# Patient Record
Sex: FEMALE | Race: WHITE | Employment: OTHER | URBAN - METROPOLITAN AREA
[De-identification: names, ages, dates, MRNs, and addresses within clinical notes are randomized per-mention and may not be internally consistent; named-entity substitution may affect disease eponyms.]

---

## 2018-09-11 ENCOUNTER — HOSPITAL ENCOUNTER (OUTPATIENT)
Dept: LAB | Age: 43
Discharge: HOME | End: 2018-09-11
Payer: OTHER GOVERNMENT

## 2018-09-11 PROCEDURE — 88305 TISSUE EXAM BY PATHOLOGIST: CPT | Performed by: PLASTIC SURGERY

## 2018-12-25 ENCOUNTER — HOSPITAL ENCOUNTER (EMERGENCY)
Age: 43
Discharge: LEFT BEFORE DISCHARGE | End: 2018-12-25
Attending: EMERGENCY MEDICINE
Payer: OTHER GOVERNMENT

## 2018-12-25 DIAGNOSIS — Z53.21 PATIENT LEFT WITHOUT BEING SEEN: Primary | ICD-10-CM

## 2018-12-25 PROCEDURE — 75810000275 HC EMERGENCY DEPT VISIT NO LEVEL OF CARE

## 2018-12-26 NOTE — ED PROVIDER NOTES
Social History     Socioeconomic History    Marital status: Not on file     Spouse name: Not on file    Number of children: Not on file    Years of education: Not on file    Highest education level: Not on file   Social Needs    Financial resource strain: Not on file    Food insecurity - worry: Not on file    Food insecurity - inability: Not on file    Transportation needs - medical: Not on file   [employer] needs - non-medical: Not on file   Occupational History    Not on file   Tobacco Use    Smoking status: Not on file   Substance and Sexual Activity    Alcohol use: Not on file    Drug use: Not on file    Sexual activity: Not on file   Other Topics Concern    Not on file   Social History Narrative    Not on file       Patient left 30 minutes after check in prior to triage.     Fausto Mcarthur MD

## 2018-12-27 ENCOUNTER — HOSPITAL ENCOUNTER (EMERGENCY)
Age: 43
Discharge: HOME | End: 2018-12-27
Attending: EMERGENCY MEDICINE
Payer: OTHER GOVERNMENT

## 2018-12-27 VITALS
OXYGEN SATURATION: 100 % | WEIGHT: 168 LBS | SYSTOLIC BLOOD PRESSURE: 146 MMHG | RESPIRATION RATE: 20 BRPM | HEART RATE: 111 BPM | BODY MASS INDEX: 29.77 KG/M2 | HEIGHT: 63 IN | TEMPERATURE: 98.3 F | DIASTOLIC BLOOD PRESSURE: 85 MMHG

## 2018-12-27 PROCEDURE — 75810000275 HC EMERGENCY DEPT VISIT NO LEVEL OF CARE

## 2018-12-27 RX ORDER — ZINC GLUCONATE 10 MG
LOZENGE ORAL
COMMUNITY
End: 2020-08-02

## 2018-12-27 RX ORDER — FLUOXETINE 10 MG/1
CAPSULE ORAL DAILY
COMMUNITY
End: 2020-08-02

## 2018-12-27 RX ORDER — PHENTERMINE HYDROCHLORIDE 37.5 MG/1
37.5 CAPSULE ORAL
COMMUNITY
End: 2019-04-04

## 2018-12-27 NOTE — ED TRIAGE NOTES
Patient with complaints of right flank pain that started 2 days ago. Patient with a history of kidney stones.

## 2018-12-27 NOTE — ED NOTES
Attempted to locate patient, not in wr bathroom, treatment area bathroom;  Called in waiting room several times, not outside the waiting area;   Called pt's number listed and voicemail left for patient

## 2019-04-02 ENCOUNTER — APPOINTMENT (OUTPATIENT)
Dept: CT IMAGING | Age: 44
DRG: 896 | End: 2019-04-02
Attending: EMERGENCY MEDICINE
Payer: OTHER GOVERNMENT

## 2019-04-02 ENCOUNTER — APPOINTMENT (OUTPATIENT)
Dept: GENERAL RADIOLOGY | Age: 44
DRG: 896 | End: 2019-04-02
Attending: EMERGENCY MEDICINE
Payer: OTHER GOVERNMENT

## 2019-04-02 ENCOUNTER — HOSPITAL ENCOUNTER (INPATIENT)
Age: 44
LOS: 2 days | Discharge: HOME | DRG: 896 | End: 2019-04-04
Attending: EMERGENCY MEDICINE | Admitting: HOSPITALIST
Payer: OTHER GOVERNMENT

## 2019-04-02 DIAGNOSIS — R56.9 NEW ONSET SEIZURE (HCC): Primary | ICD-10-CM

## 2019-04-02 PROBLEM — Z79.899 POLYPHARMACY: Status: ACTIVE | Noted: 2019-04-02

## 2019-04-02 PROBLEM — F19.939 DRUG WITHDRAWAL (HCC): Status: ACTIVE | Noted: 2019-04-02

## 2019-04-02 PROBLEM — G40.901 STATUS EPILEPTICUS (HCC): Status: ACTIVE | Noted: 2019-04-02

## 2019-04-02 LAB
ALBUMIN SERPL-MCNC: 4 G/DL (ref 3.4–5)
ALBUMIN/GLOB SERPL: 1.3 {RATIO} (ref 0.8–1.7)
ALP SERPL-CCNC: 99 U/L (ref 45–117)
ALT SERPL-CCNC: 18 U/L (ref 13–56)
AMMONIA PLAS-SCNC: <10 UMOL/L (ref 11–32)
AMPHET UR QL SCN: NEGATIVE
ANION GAP SERPL CALC-SCNC: 10 MMOL/L (ref 3–18)
APAP SERPL-MCNC: <2 UG/ML (ref 10–30)
APPEARANCE UR: CLEAR
AST SERPL-CCNC: 18 U/L (ref 15–37)
BARBITURATES UR QL SCN: NEGATIVE
BASOPHILS # BLD: 0 K/UL (ref 0–0.1)
BASOPHILS NFR BLD: 0 % (ref 0–2)
BENZODIAZ UR QL: POSITIVE
BILIRUB SERPL-MCNC: 0.2 MG/DL (ref 0.2–1)
BILIRUB UR QL: NEGATIVE
BUN SERPL-MCNC: 11 MG/DL (ref 7–18)
BUN/CREAT SERPL: 11 (ref 12–20)
CALCIUM SERPL-MCNC: 8.9 MG/DL (ref 8.5–10.1)
CANNABINOIDS UR QL SCN: NEGATIVE
CHLORIDE SERPL-SCNC: 114 MMOL/L (ref 100–108)
CK MB CFR SERPL CALC: NORMAL % (ref 0–4)
CK MB SERPL-MCNC: <1 NG/ML (ref 5–25)
CK SERPL-CCNC: 57 U/L (ref 26–192)
CO2 SERPL-SCNC: 20 MMOL/L (ref 21–32)
COCAINE UR QL SCN: NEGATIVE
COLOR UR: YELLOW
CREAT SERPL-MCNC: 1.01 MG/DL (ref 0.6–1.3)
DIFFERENTIAL METHOD BLD: ABNORMAL
EOSINOPHIL # BLD: 0.1 K/UL (ref 0–0.4)
EOSINOPHIL NFR BLD: 0 % (ref 0–5)
ERYTHROCYTE [DISTWIDTH] IN BLOOD BY AUTOMATED COUNT: 13.7 % (ref 11.6–14.5)
EST. AVERAGE GLUCOSE BLD GHB EST-MCNC: NORMAL MG/DL
ETHANOL SERPL-MCNC: <3 MG/DL (ref 0–3)
FOLATE SERPL-MCNC: >20 NG/ML (ref 3.1–17.5)
GLOBULIN SER CALC-MCNC: 3.1 G/DL (ref 2–4)
GLUCOSE BLD STRIP.AUTO-MCNC: 100 MG/DL (ref 70–110)
GLUCOSE BLD STRIP.AUTO-MCNC: 100 MG/DL (ref 70–110)
GLUCOSE BLD STRIP.AUTO-MCNC: 119 MG/DL (ref 70–110)
GLUCOSE BLD STRIP.AUTO-MCNC: 120 MG/DL (ref 70–110)
GLUCOSE SERPL-MCNC: 127 MG/DL (ref 74–99)
GLUCOSE UR STRIP.AUTO-MCNC: NEGATIVE MG/DL
HBA1C MFR BLD: 4.8 % (ref 4.2–5.6)
HCG UR QL: NEGATIVE
HCT VFR BLD AUTO: 42.5 % (ref 35–45)
HDSCOM,HDSCOM: ABNORMAL
HGB BLD-MCNC: 14.1 G/DL (ref 12–16)
HGB UR QL STRIP: NEGATIVE
KETONES UR QL STRIP.AUTO: NEGATIVE MG/DL
LACTATE BLD-SCNC: 2.29 MMOL/L (ref 0.4–2)
LACTATE SERPL-SCNC: 2 MMOL/L (ref 0.4–2)
LEUKOCYTE ESTERASE UR QL STRIP.AUTO: NEGATIVE
LIPASE SERPL-CCNC: 164 U/L (ref 73–393)
LITHIUM SERPL-SCNC: 0.32 MMOL/L (ref 0.6–1.2)
LYMPHOCYTES # BLD: 2.1 K/UL (ref 0.9–3.6)
LYMPHOCYTES NFR BLD: 13 % (ref 21–52)
MAGNESIUM SERPL-MCNC: 2.4 MG/DL (ref 1.6–2.6)
MCH RBC QN AUTO: 30.8 PG (ref 24–34)
MCHC RBC AUTO-ENTMCNC: 33.2 G/DL (ref 31–37)
MCV RBC AUTO: 92.8 FL (ref 74–97)
METHADONE UR QL: NEGATIVE
MONOCYTES # BLD: 0.7 K/UL (ref 0.05–1.2)
MONOCYTES NFR BLD: 5 % (ref 3–10)
NEUTS SEG # BLD: 12.9 K/UL (ref 1.8–8)
NEUTS SEG NFR BLD: 82 % (ref 40–73)
NITRITE UR QL STRIP.AUTO: NEGATIVE
OPIATES UR QL: NEGATIVE
PCP UR QL: NEGATIVE
PH UR STRIP: 6.5 [PH] (ref 5–8)
PLATELET # BLD AUTO: 424 K/UL (ref 135–420)
PMV BLD AUTO: 9.2 FL (ref 9.2–11.8)
POTASSIUM SERPL-SCNC: 4.2 MMOL/L (ref 3.5–5.5)
PROT SERPL-MCNC: 7.1 G/DL (ref 6.4–8.2)
PROT UR STRIP-MCNC: NEGATIVE MG/DL
RBC # BLD AUTO: 4.58 M/UL (ref 4.2–5.3)
SALICYLATES SERPL-MCNC: 5.1 MG/DL (ref 2.8–20)
SODIUM SERPL-SCNC: 144 MMOL/L (ref 136–145)
SP GR UR REFRACTOMETRY: 1.01 (ref 1–1.03)
TROPONIN I SERPL-MCNC: <0.02 NG/ML (ref 0–0.04)
TSH SERPL DL<=0.05 MIU/L-ACNC: 1.9 UIU/ML (ref 0.36–3.74)
UROBILINOGEN UR QL STRIP.AUTO: 0.2 EU/DL (ref 0.2–1)
VIT B12 SERPL-MCNC: 1970 PG/ML (ref 211–911)
WBC # BLD AUTO: 15.8 K/UL (ref 4.6–13.2)

## 2019-04-02 PROCEDURE — 74011250636 HC RX REV CODE- 250/636

## 2019-04-02 PROCEDURE — 82140 ASSAY OF AMMONIA: CPT

## 2019-04-02 PROCEDURE — 96374 THER/PROPH/DIAG INJ IV PUSH: CPT

## 2019-04-02 PROCEDURE — 74011000258 HC RX REV CODE- 258: Performed by: HOSPITALIST

## 2019-04-02 PROCEDURE — 36415 COLL VENOUS BLD VENIPUNCTURE: CPT

## 2019-04-02 PROCEDURE — 93005 ELECTROCARDIOGRAM TRACING: CPT

## 2019-04-02 PROCEDURE — 83690 ASSAY OF LIPASE: CPT

## 2019-04-02 PROCEDURE — 84443 ASSAY THYROID STIM HORMONE: CPT

## 2019-04-02 PROCEDURE — 80178 ASSAY OF LITHIUM: CPT

## 2019-04-02 PROCEDURE — 74011250636 HC RX REV CODE- 250/636: Performed by: HOSPITALIST

## 2019-04-02 PROCEDURE — 83735 ASSAY OF MAGNESIUM: CPT

## 2019-04-02 PROCEDURE — 95819 EEG AWAKE AND ASLEEP: CPT | Performed by: PSYCHIATRY & NEUROLOGY

## 2019-04-02 PROCEDURE — 82962 GLUCOSE BLOOD TEST: CPT

## 2019-04-02 PROCEDURE — 82607 VITAMIN B-12: CPT

## 2019-04-02 PROCEDURE — 85025 COMPLETE CBC W/AUTO DIFF WBC: CPT

## 2019-04-02 PROCEDURE — 80307 DRUG TEST PRSMV CHEM ANLYZR: CPT

## 2019-04-02 PROCEDURE — 83605 ASSAY OF LACTIC ACID: CPT

## 2019-04-02 PROCEDURE — 65610000006 HC RM INTENSIVE CARE

## 2019-04-02 PROCEDURE — 77010033678 HC OXYGEN DAILY

## 2019-04-02 PROCEDURE — 71045 X-RAY EXAM CHEST 1 VIEW: CPT

## 2019-04-02 PROCEDURE — 99285 EMERGENCY DEPT VISIT HI MDM: CPT

## 2019-04-02 PROCEDURE — 70450 CT HEAD/BRAIN W/O DYE: CPT

## 2019-04-02 PROCEDURE — 74011250636 HC RX REV CODE- 250/636: Performed by: EMERGENCY MEDICINE

## 2019-04-02 PROCEDURE — 83036 HEMOGLOBIN GLYCOSYLATED A1C: CPT

## 2019-04-02 PROCEDURE — 96376 TX/PRO/DX INJ SAME DRUG ADON: CPT

## 2019-04-02 PROCEDURE — 80053 COMPREHEN METABOLIC PANEL: CPT

## 2019-04-02 PROCEDURE — 81003 URINALYSIS AUTO W/O SCOPE: CPT

## 2019-04-02 PROCEDURE — 81025 URINE PREGNANCY TEST: CPT

## 2019-04-02 PROCEDURE — 74011000250 HC RX REV CODE- 250: Performed by: HOSPITALIST

## 2019-04-02 PROCEDURE — 82550 ASSAY OF CK (CPK): CPT

## 2019-04-02 RX ORDER — LEVETIRACETAM 100 MG/ML
1000 SOLUTION ORAL
Status: DISCONTINUED | OUTPATIENT
Start: 2019-04-02 | End: 2019-04-02

## 2019-04-02 RX ORDER — ENOXAPARIN SODIUM 100 MG/ML
40 INJECTION SUBCUTANEOUS EVERY 24 HOURS
Status: DISCONTINUED | OUTPATIENT
Start: 2019-04-02 | End: 2019-04-04 | Stop reason: HOSPADM

## 2019-04-02 RX ORDER — LORAZEPAM 2 MG/ML
1 INJECTION INTRAMUSCULAR
Status: DISCONTINUED | OUTPATIENT
Start: 2019-04-02 | End: 2019-04-04

## 2019-04-02 RX ORDER — LEVETIRACETAM 10 MG/ML
1000 INJECTION INTRAVASCULAR
Status: COMPLETED | OUTPATIENT
Start: 2019-04-02 | End: 2019-04-02

## 2019-04-02 RX ORDER — ONDANSETRON 2 MG/ML
4 INJECTION INTRAMUSCULAR; INTRAVENOUS
Status: DISCONTINUED | OUTPATIENT
Start: 2019-04-02 | End: 2019-04-04

## 2019-04-02 RX ORDER — LORAZEPAM 2 MG/ML
INJECTION INTRAMUSCULAR
Status: COMPLETED
Start: 2019-04-02 | End: 2019-04-02

## 2019-04-02 RX ORDER — MAGNESIUM SULFATE 100 %
4 CRYSTALS MISCELLANEOUS AS NEEDED
Status: DISCONTINUED | OUTPATIENT
Start: 2019-04-02 | End: 2019-04-04 | Stop reason: HOSPADM

## 2019-04-02 RX ORDER — ACETAMINOPHEN 325 MG/1
650 TABLET ORAL
Status: DISCONTINUED | OUTPATIENT
Start: 2019-04-02 | End: 2019-04-04 | Stop reason: HOSPADM

## 2019-04-02 RX ORDER — LEVETIRACETAM 100 MG/ML
1000 SOLUTION ORAL
Status: DISCONTINUED | OUTPATIENT
Start: 2019-04-02 | End: 2019-04-02 | Stop reason: SDUPTHER

## 2019-04-02 RX ORDER — SODIUM CHLORIDE 9 MG/ML
125 INJECTION, SOLUTION INTRAVENOUS CONTINUOUS
Status: DISCONTINUED | OUTPATIENT
Start: 2019-04-02 | End: 2019-04-03

## 2019-04-02 RX ORDER — DEXTROSE 50 % IN WATER (D50W) INTRAVENOUS SYRINGE
25-50 AS NEEDED
Status: DISCONTINUED | OUTPATIENT
Start: 2019-04-02 | End: 2019-04-04 | Stop reason: HOSPADM

## 2019-04-02 RX ORDER — LITHIUM CARBONATE 300 MG
300 TABLET ORAL 3 TIMES DAILY
COMMUNITY
End: 2020-08-02

## 2019-04-02 RX ORDER — LORAZEPAM 2 MG/ML
INJECTION INTRAMUSCULAR
Status: DISPENSED
Start: 2019-04-02 | End: 2019-04-02

## 2019-04-02 RX ORDER — DIPHENOXYLATE HYDROCHLORIDE AND ATROPINE SULFATE 2.5; .025 MG/1; MG/1
TABLET ORAL
COMMUNITY
End: 2020-08-02

## 2019-04-02 RX ORDER — CODEINE SULFATE 30 MG/1
TABLET ORAL
COMMUNITY
End: 2019-04-04

## 2019-04-02 RX ORDER — LEVETIRACETAM 10 MG/ML
1000 INJECTION INTRAVASCULAR EVERY 12 HOURS
Status: DISCONTINUED | OUTPATIENT
Start: 2019-04-02 | End: 2019-04-03

## 2019-04-02 RX ORDER — DIAZEPAM 5 MG/1
5 TABLET ORAL
COMMUNITY
End: 2019-04-04

## 2019-04-02 RX ORDER — LORAZEPAM 2 MG/ML
2 INJECTION INTRAMUSCULAR
Status: CANCELLED | OUTPATIENT
Start: 2019-04-02 | End: 2019-04-02

## 2019-04-02 RX ORDER — INSULIN LISPRO 100 [IU]/ML
INJECTION, SOLUTION INTRAVENOUS; SUBCUTANEOUS EVERY 6 HOURS
Status: DISCONTINUED | OUTPATIENT
Start: 2019-04-02 | End: 2019-04-03

## 2019-04-02 RX ORDER — INSULIN LISPRO 100 [IU]/ML
INJECTION, SOLUTION INTRAVENOUS; SUBCUTANEOUS
Status: DISCONTINUED | OUTPATIENT
Start: 2019-04-02 | End: 2019-04-02

## 2019-04-02 RX ORDER — LORAZEPAM 2 MG/ML
2 INJECTION INTRAMUSCULAR ONCE
Status: COMPLETED | OUTPATIENT
Start: 2019-04-02 | End: 2019-04-02

## 2019-04-02 RX ORDER — QUETIAPINE FUMARATE 100 MG/1
50 TABLET, FILM COATED ORAL 2 TIMES DAILY
COMMUNITY
End: 2019-04-04

## 2019-04-02 RX ORDER — HYDROCODONE BITARTRATE AND ACETAMINOPHEN 10; 325 MG/1; MG/1
1 TABLET ORAL
COMMUNITY
End: 2019-04-04

## 2019-04-02 RX ADMIN — FOLIC ACID: 5 INJECTION, SOLUTION INTRAMUSCULAR; INTRAVENOUS; SUBCUTANEOUS at 05:07

## 2019-04-02 RX ADMIN — LORAZEPAM 2 MG: 2 INJECTION INTRAMUSCULAR at 00:29

## 2019-04-02 RX ADMIN — ENOXAPARIN SODIUM 40 MG: 40 INJECTION SUBCUTANEOUS at 06:06

## 2019-04-02 RX ADMIN — LORAZEPAM 2 MG: 2 INJECTION INTRAMUSCULAR; INTRAVENOUS at 00:29

## 2019-04-02 RX ADMIN — SODIUM CHLORIDE 125 ML/HR: 900 INJECTION, SOLUTION INTRAVENOUS at 05:07

## 2019-04-02 RX ADMIN — LEVETIRACETAM 1000 MG: 10 INJECTION INTRAVENOUS at 14:10

## 2019-04-02 RX ADMIN — FAMOTIDINE 20 MG: 10 INJECTION, SOLUTION INTRAVENOUS at 23:10

## 2019-04-02 RX ADMIN — PIPERACILLIN AND TAZOBACTAM 3.38 G: 3; .375 INJECTION, POWDER, FOR SOLUTION INTRAVENOUS at 07:52

## 2019-04-02 RX ADMIN — FAMOTIDINE 20 MG: 10 INJECTION, SOLUTION INTRAVENOUS at 09:50

## 2019-04-02 RX ADMIN — LORAZEPAM 2 MG: 2 INJECTION INTRAMUSCULAR at 02:25

## 2019-04-02 RX ADMIN — SODIUM CHLORIDE 125 ML/HR: 900 INJECTION, SOLUTION INTRAVENOUS at 15:59

## 2019-04-02 RX ADMIN — LEVETIRACETAM 1000 MG: 10 INJECTION INTRAVENOUS at 03:54

## 2019-04-02 RX ADMIN — PIPERACILLIN AND TAZOBACTAM 3.38 G: 3; .375 INJECTION, POWDER, FOR SOLUTION INTRAVENOUS at 15:56

## 2019-04-02 RX ADMIN — PIPERACILLIN AND TAZOBACTAM 3.38 G: 3; .375 INJECTION, POWDER, FOR SOLUTION INTRAVENOUS at 23:10

## 2019-04-02 RX ADMIN — LORAZEPAM 1 MG: 2 INJECTION INTRAMUSCULAR; INTRAVENOUS at 23:46

## 2019-04-02 NOTE — ED NOTES
Received call from ed tech in CT w/pt. Per CT tech, pt having seizure, twitching, pulling gown off. Received verbal order from Dr Bard Yue ANGULO for 2mg ativan IV. Upon arrival in CT pt agitated and twitching, pulling clothes off, incontinent, not oriented, ativan given 2mg IV at 0225.  Pt back from CT, cleaned, pt gurgling, suctioned, attempted NPA placement but pt became awake and began flailing limbs, discontinued attempt, pt placed on 1L NC.

## 2019-04-02 NOTE — PROGRESS NOTES
520 [facility] ED called to give report on patient before coming to ICU 
0345 Patient arrived in ICU, connected to ICU monitors, linen and gown changed, CHG bath given, patient is unresponsive postictal 
0400 Assessment complete, patient is bradycardic  
0615 Dr Bill Zarco came to see patient, aware that patient has been bradycardic, patient has still not woken up from postictal stage, will assess when patient is alert. Seizure precautions remain, padded bed rails in place

## 2019-04-02 NOTE — PROGRESS NOTES
attempted visit with patient but she was asleep and unavailable.  left devotional and brochure. Chaplains remain available to provide pastoral support to patient and family as needed and requested. . Lalo Goel [phone]

## 2019-04-02 NOTE — ED PROVIDER NOTES
EMERGENCY DEPARTMENT HISTORY AND PHYSICAL EXAM 
 
Date: 4/2/2019 Patient Name: Edna Sosa History of Presenting Illness No chief complaint on file. HPI:  
12:25 AM 
Edna Sosa is a 37 y.o. female with H/O possible seizure who presents to the emergency department C/O questionable seizure. Spouse at bedside initially saw pt standing and somewhat confused. Then pt sent to her room and when he followed her, she started having tremors, became unresponsive and describes seizure like activity. She was incontinent of both urine and stool. Spouse thinks that she may have taken too much medication, he thinks Flexeril. Spouse was not aware of any drug use or EtOH abuse but he is aware she takes too many medications. EMS reports postictal state at scene. Pt is somnolent and appears postictal at ED arrival and unable to obtain any hx from her Spouse also brought pt's medications and she has 3 empty bottles of Valium, Norco, and Tylenol 3. All 3 bottles were filled in March. PCP: Other, MD Martir 
 
Current Outpatient Medications Medication Sig Dispense Refill  magnesium 250 mg tab Take  by mouth.  FLUoxetine (PROZAC) 10 mg capsule Take  by mouth daily.  phentermine (ADIPEX-P) 37.5 mg capsule Take 37.5 mg by mouth every morning. Past History Past Medical History: 
Past Medical History:  
Diagnosis Date  Kidney stones Past Surgical History: No past surgical history on file. Family History: No family history on file. Social History: 
Social History Tobacco Use  Smoking status: Passive Smoke Exposure - Never Smoker  Smokeless tobacco: Never Used Substance Use Topics  Alcohol use: No  
  Frequency: Never  Drug use: Not on file Allergies: 
No Known Allergies Review of Systems Review of Systems Unable to perform ROS: Mental status change Physical Exam  
There were no vitals filed for this visit.  
Physical Exam  
 Constitutional: She is oriented to person, place, and time. She appears well-developed and well-nourished. No distress. Pt appears postictal. She mumbles. She appears confuse and she has some twitching with rhythmic movements of both lower extremities and twitching of the right hand. HENT:  
Head: Normocephalic and atraumatic. Right Ear: External ear normal. No swelling or tenderness. Tympanic membrane is not perforated, not erythematous and not bulging. Left Ear: External ear normal. No swelling or tenderness. Tympanic membrane is not perforated, not erythematous and not bulging. Nose: Nose normal. No mucosal edema or rhinorrhea. Right sinus exhibits no maxillary sinus tenderness and no frontal sinus tenderness. Left sinus exhibits no maxillary sinus tenderness and no frontal sinus tenderness. Mouth/Throat: Uvula is midline, oropharynx is clear and moist and mucous membranes are normal. No oral lesions. No trismus in the jaw. No dental abscesses or uvula swelling. No oropharyngeal exudate, posterior oropharyngeal edema, posterior oropharyngeal erythema or tonsillar abscesses. Scalp shows no sign of trauma. Dentition is intact. Tongue is intact, no bleeding. Eyes: Conjunctivae are normal. Right eye exhibits no discharge. Left eye exhibits no discharge. No scleral icterus. Neck: Normal range of motion. Neck supple. Cardiovascular: Normal rate, regular rhythm, normal heart sounds and intact distal pulses. Exam reveals no gallop and no friction rub. No murmur heard. Pulmonary/Chest: Effort normal and breath sounds normal. No accessory muscle usage. No tachypnea. No respiratory distress. She has no decreased breath sounds. She has no wheezes. She has no rhonchi. She has no rales. Abdominal: Soft. Musculoskeletal: Normal range of motion. She exhibits no edema or tenderness. Lymphadenopathy:  
  She has no cervical adenopathy. Neurological: She is alert and oriented to person, place, and time. GCS eye subscore is 2. GCS verbal subscore is 4. GCS motor subscore is 5. As above, pt is postictal; possibly having seizure. She is unable to follow commands but will open eyes to pain. Moves extremities to pain. Skin: Skin is warm and dry. No rash noted. She is not diaphoretic. No erythema. Psychiatric: She has a normal mood and affect. Judgment normal.  
Nursing note and vitals reviewed. Diagnostic Study Results Labs - No results found for this or any previous visit (from the past 12 hour(s)). Radiologic Studies -  
CT HEAD WO CONT Final Result IMPRESSION:  
  
No acute intracranial abnormality. Partially imaged right maxillary sinus opacity of uncertain acuity and current  
significance. XR CHEST PORT Final Result Impression:  
-------------- No active pulmonary disease. CT Results  (Last 48 hours) None CXR Results  (Last 48 hours) None Medications given in the ED- Medications - No data to display Medical Decision Making I am the first provider for this patient. I reviewed the vital signs, available nursing notes, past medical history, past surgical history, family history and social history. Vital Signs-Reviewed the patient's vital signs. Pulse Oximetry Analysis - 98% on O2 via NC Cardiac Monitor: 
Rate: 82 bpm 
Rhythm: NSR 
 
EKG interpretation: (Preliminary) 12:41 AM  
NSR at 68 bpm. Normal QRS Records Reviewed: Nursing Notes and Old Medical Records Provider Notes (Medical Decision Making):  
INITIAL CLINICAL IMPRESSION and PLANS: 
The patient presents with the primary complaint(s) of: seizure. The presentation, to include historical aspects and clinical findings are consistent with the DX of seizure. However, other possible DX's to consider as primary, associated with, or exacerbated by include: 1. Drug withdrawal 
2. Status epilepticus 3.  Encephalopathy 4. Intracranial bleed 5. CVA 6. Drug overdose 7. MI 8. ACS 9. Psych Procedures: 
Procedures ED Course:  
12:25 AM Initial assessment performed. The patients presenting problems have been discussed, and they are in agreement with the care plan formulated and outlined with them. I have encouraged them to ask questions as they arise throughout their visit. CONSULT NOTE:  
2:27 AM 
I spoke with Meryle Pearson, MD  
Specialty: Neurology Discussed pt's hx, disposition, and available diagnostic and imaging results  over the telephone. Reviewed care plans. Consulting physician agrees with plans as outlined. Recommended 1 g Keppra and admit to the hospitalist.   
 
CONSULT NOTE:  
3:08 AM 
I spoke with Sabrina Brooks MD  
Specialty: Hospitalist 
Discussed pt's hx, disposition, and available diagnostic and imaging results  over the telephone. Reviewed care plans. Consulting physician agrees with plans as outlined. Agrees to admit. Discussion: 
Spouse described seizure prior to pt being brought to ED by EMS. Appears pt had another seizure witnessed by MD and treated with Ativan. Spouse also brought empty bottles of narcotics and Valium. The bottles are empty and if pt was compliant, she would have at least a week left of Norco and Tylenol 3. Spouse thought she was taking Flexeril but she had an old bottle of Baclofen. If she finished these 3 bottles of her meds early, she could be having withdrawal seizures. Her drug screen was positive only for benzo's indicating that she may have been taking more than prescribed doses of her narcotics. Consulted neurology who recommended Keppra and admission to hospitalist. 
 
 
Diagnosis and Disposition Critical Care Time:  
3:02 PM 
I have spent 60 minutes of critical care time involved in lab review, consultations with specialist, family decision-making, and documentation.   During this entire length of time I was immediately available to the patient. Critical Care: The reason for providing this level of medical care for this critically ill patient was due a critical illness that impaired one or more vital organ systems such that there was a high probability of imminent or life threatening deterioration in the patients condition. This care involved high complexity decision making to assess, manipulate, and support vital system functions, to treat this degree vital organ system failure and to prevent further life threatening deterioration of the patients condition. Core Measures: 
For Hospitalized Patients: 
 
1. Hospitalization Decision Time: The decision to hospitalize the patient was made by Albino Oconnor MD at 12:25 AM on 4/2/2019 2. Aspirin: Aspirin was not given because the patient did not present with a stroke at the time of their Emergency Department evaluation 3:08 AM  Patient is being admitted to the hospital by Srikanth Serrato MD. The results of their tests and reasons for their admission have been discussed with them and/or available family. They convey agreement and understanding for the need to be admitted and for their admission diagnosis. CONDITIONS ON ADMISSION: 
Sepsis is not present at the time of admission. Deep Vein Thrombosis is not present at the time of admission. Thrombosis is not present at the time of admission. Urinary Tract Infection is not present at the time of admission. Pneumonia is not present at the time of admission. MRSA is not present at the time of admission. Wound infection is not present at the time of admission. Pressure Ulcer is not present at the time of admission. CLINICAL IMPRESSION: 
 
1. New onset seizure ([address] PLAN: 
1.  Admit to ICU

## 2019-04-02 NOTE — H&P
[facility] [place] HISTORY AND PHYSICAL Name:  Coreen Kothari 
MR#:   [id number] :  1975 ACCOUNT #:  [de-identified] ADMIT DATE:  2019 ADMISSION DIAGNOSES: 
1.  Status epilepticus. 2.  Altered mental status. 3.  Polypharmacy. HISTORY OF PRESENT ILLNESS:  This is a 80-year-old  female who is brought into the hospital because of altered mental status and seizure at home. Her  had come home from work. He found her to be confused, complaining of headache and dizziness. She apparently collapsed and had a seizure. She was brought into the emergency room where she was brought by EMS. She was only alert to pain upon arrival.  She had incontinence of bowel and bladder and her  was concerned that she may have taken too much Flexeril, but has no history of seizures in the past.  She is unable to give any history at this point in time. She has had Ativan for recurrent seizure when she went to CT scan. She arouses to a sternal rub, but she is not verbal at this point and she is certainly not able to give any history. Her  took her child home, so he is not present currently for my exam.  She was examined in the emergency room. PAST MEDICAL HISTORY:  Per report includes kidney stones, depression. PAST SURGICAL HISTORY:  Unknown. FAMILY HISTORY:  Unknown. SOCIAL HISTORY:  Not a smoker. No regular alcohol use. No polysubstance or illicit substance use per the 's report. ALLERGIES:  SHE HAS NO MEDICATION ALLERGY LISTED. MEDICATIONS:  She has on the list came in with Codeine with Tylenol, Valium, Lomotil, Prozac, Norco, lithium, magnesium, Adipex, and Seroquel. Apparently, she has a [address] [place] [address] Dr, we do not know that name. REVIEW OF SYSTEMS:  Is not able to be obtained due to the patient's altered mental status. PHYSICAL EXAMINATION: 
GENERAL:  This is a 80-year-old  female who is somnolent. VITAL SIGNS:  Her temperature is 96.7, pulse 51, blood pressure 154/76, respiratory rate is 13, SaO2 is 100% on 1 liter nasal cannula. She is 121 pounds. HEENT:  Her oropharynx is dry. No lesions. Sclerae anicteric. Conjunctivae pink. Pupils are constricted, not reactive currently. She just received Ativan 2 mg. NECK:  Supple. Appears to be without adenopathy or mass. LUNGS:  Clear bilaterally. No rales, rhonchi, or wheezes. CARDIAC:  Regular rate and rhythm. No murmur, rub, or gallop. ABDOMEN:  Soft, nontender. No distention. Normal bowel sounds. No ascites or mass. LOWER EXTREMITIES:  No clubbing, cyanosis, or edema. Distal pulses are palpable. NEUROLOGIC:  Unable to be performed due to her somnolent status. LABORATORY DATA:  Lactate was 2.29. Her white count 15.8, H and H 14.1 and 42.5, platelets are 427. Urinalysis was within normal limits. Her chemistry also unremarkable. Glucose 127. Lactate normalized to 2. On repeat, her urine pregnancy was negative. Her liver enzymes and cardiac enzymes are normal.  Urine drug screen was positive for benzos. Alcohol level less than 3. Lithium level has been sent. Acetaminophen level less than 2. Salicylate level within normal limits at 5.1. CT scan without contrast of the head shows no acute intracranial abnormality. Chest x-ray shows no active pulmonary disease and EKG shows normal sinus rhythm. Neurology consult was obtained from the ER. Recommendations were to load with Keppra and to consider polypharmacy contributing to seizure state. ASSESSMENT: 
1.  Status epilepticus. 2.  New onset seizure disorder. 3.  Polypharmacy and multiple medications. 4.  Unknown medical history, preempting multiple medications on her list for what appeared to be pain issues, possible psychiatric issues also. Certainly, we will need to obtain more information when her  comes back in.   I would like to know if she had been hospitalized for any psychiatric issue or overdose in the past.  Her  was not concerned about suicidal ideation per the nursing report or overdose. Also, he did feel like she had taken too many of her medications in an attempt to feel better or treatment underlying condition that he was maybe not aware of, so we need further clarification on what medication she is supposed to be on, whether there was attempts to overdose on these. At this point, there is no toxic levels noted. CT scan showed no acute stroke. We are going to continue Keppra 1 g every 12 hours, fluid resuscitation. I am going to give her a banana bag once a day at least for the next 24-48 hours. I am going to check an ammonia level, hemoglobin A1c, TSH, vitamin B12, and folate. She will be n.p.o. for now. We will allow her to wake up. She will have a neurology consult this morning and once she is awake and able to communicate, we will discuss whether she needs to have consultation with psychiatry here. Aspiration precautions in the interim, ICU admission expect at least 3 days in the hospital. 
 
 
Luis Sorensen MD 
 
 
[id number] 
D:  04/02/2019 4:48 T:  04/02/2019 4:51 JOB #:  [id number]

## 2019-04-02 NOTE — DIABETES MGMT
GLYCEMIC CONTROL PROGRESS NOTE: 
 
GLYCEMIC CONTROL & NUTRITION: 
 
 
- chart reviewed, no known h/o DM 
- glycemic control needs identified at this time Recent Glucose Results:  
Lab Results Component Value Date/Time  (H) 04/02/2019 12:40 AM  
 GLUCPOC 100 04/02/2019 06:07 AM  
 
Timmy Posada MS, RN, CDE Glycemic Control Team 
[phone] Pager [phone] (M-TH 8:00-4:30P) *After Hours pager [phone]

## 2019-04-02 NOTE — PROGRESS NOTES
Bedside and Verbal shift change report given to Giuseppe Price RN (oncoming nurse) by Estela Wolfe RN (offgoing nurse). Report included the following information SBAR, ED Summary, Intake/Output, MAR, Recent Results, Cardiac Rhythm sinus roderick and Quality Measures.

## 2019-04-02 NOTE — ROUTINE PROCESS
TRANSFER - OUT REPORT: 
 
Verbal report given to Tobi RN(name) on Rhonda Kaur  being transferred to ICU(unit) for routine progression of care Report consisted of patients Situation, Background, Assessment and  
Recommendations(SBAR). Information from the following report(s) SBAR, ED Summary, [facility] and Cardiac Rhythm Sinus Gloria Cobos was reviewed with the receiving nurse. Lines:  
Saline Lock 04/02/19 Left Antecubital (Active) Site Assessment Clean, dry, & intact 4/2/2019  1:06 AM  
Phlebitis Assessment 0 4/2/2019  1:06 AM  
Infiltration Assessment 0 4/2/2019  1:06 AM  
Dressing Status Clean, dry, & intact 4/2/2019  1:06 AM  
Dressing Type Transparent 4/2/2019  1:06 AM  
Hub Color/Line Status Pink;Flushed;Patent 4/2/2019  1:06 AM  
Action Taken Blood drawn 4/2/2019  1:06 AM  
   
Saline Lock 04/02/19 Right Hand (Active) Site Assessment Clean, dry, & intact 4/2/2019  1:06 AM  
Phlebitis Assessment 0 4/2/2019  1:06 AM  
Infiltration Assessment 0 4/2/2019  1:06 AM  
Dressing Status Clean, dry, & intact 4/2/2019  1:06 AM  
Dressing Type Transparent 4/2/2019  1:06 AM  
Hub Color/Line Status Pink;Flushed;Patent 4/2/2019  1:06 AM  
Action Taken Blood drawn 4/2/2019  1:06 AM  
  
 
Opportunity for questions and clarification was provided. Patient transported with: 
 Monitor O2 @ 1 liters Registered Nurse

## 2019-04-02 NOTE — ED NOTES
Primary Nurse Cash Magallanes and Zenia ROTHMAN RN, RN performed a dual skin assessment on this patient No impairment noted

## 2019-04-02 NOTE — PROCEDURES
ELECTROENCEPHALOGRAPHY     Patient: Loy Posadas MRN: [id number]  CSN: [id number]    YOB: 1975  Age: 37 y.o. Sex: female    DOA: 4/2/2019 LOS:  LOS: 0 days        Date of Service: 04/02/2019    DICTATING: Constance Neal MD     REFERRING PHYSICIAN: Dr. Ludy Talbert: 19-23    HISTORY OF PRESENT ILLNESS: This is a 27-year-old female who presented to ER with unresponsiveness and seizures. Currently, she is unresponsive. This EEG was performed in order to assess electrodiagnostic risk factors for epilepsy. ELECTROENCEPHALOGRAM INTERPRETATION: This is a referential and bipolar EEG recorded with a 10-20 system. EEG background Shows remarkable myogenic artifact in the frontal leads, which impairs the EEG recording. There is generalized 2-3 hertz delta activity intermixed with 4 to 5 hertz theta activities. Even though the patient is seem to be sleeping, there is no regular sleep pattern. Photic stimulation does not elicit any abnormal activity. There are intermittent interictal discharges on the right paracentral Regions during the study. IMPRESSION: This EEG is abnormal due to moderate generalized slowing, which is an indicator of diffuse cerebral dysfunction from any cause including -but not limited to- toxic, metabolic and infectious etiologies. There are also intermittent interictal discharges on the right paracentral region, which indicate increased risk factor for seizures and possible localizations for them. Clinical correlation is recommended.          Signed:  Constance Neal MD  4/2/2019  4:43 PM

## 2019-04-02 NOTE — CONSULTS
[facility]                  Pulmonary, Critical Care, and Sleep Medicine     Name: Arley Joseph MRN: [id number]   : 1975 Hospital: [facility] [place]    [facility] 2019        [facility]                                              Consult requesting physician: Dr. Michael Christie  Reason for Consult: seizure    Subjective/History of Present Illness:     Patient is a 37 y.o. female with PMHx significant for chronic LBP, spinal cord stimulator removal 2017, admitted with seizure likely due to polypharmacy vs benzo withdrawal.     2019  Admitted with unresponsiveness and seizure. Seizure at home and in ER and in CT department. Generalized tonic-clonic. A/w urine and bowel incontinence. Postictal. Received ativan in ER and in CT. CT head nothing acute. She was on several medications including lithium, diazepam, hydrocodone, codeine, quetiapine, fluoxetine and phentermine.  reported taking too many flexeril, but it is not on her medication list.   UTOx positive for benzo. Still AMS and so hx is taken from EMR. The patient is critically ill and can not provide additional history due to Unable to speak and Unable to comprehend. History taken from EMR    Review of Systems:  Review of systems not obtained due to patient factors. No Known Allergies   Past Medical History:   Diagnosis Date    Kidney stones       No past surgical history on file. Social History     Tobacco Use    Smoking status: Passive Smoke Exposure - Never Smoker    Smokeless tobacco: Never Used   Substance Use Topics    Alcohol use: No     Frequency: Never      No family history on file. Prior to Admission medications    Medication Sig Start Date End Date Taking? Authorizing Provider   lithium carbonate 300 mg tablet Take 300 mg by mouth three (3) times daily.    Yes Other, MD Martir   diphenoxylate-atropine (LOMOTIL) 2.5-0.025 mg per tablet Take  by mouth four (4) times daily as needed for Diarrhea. Yes Sandra, MD Martir   diazePAM (VALIUM) 5 mg tablet Take 5 mg by mouth every six (6) hours as needed for Anxiety. Yes Sandra, MD Martir   HYDROcodone-acetaminophen (NORCO)  mg tablet Take 1 Tab by mouth. Yes Sandra, MD Martir   codeine sulfate 30 mg tablet Take  by mouth every four (4) hours as needed. Yes Sandra, MD Martir   QUEtiapine (SEROQUEL) 100 mg tablet Take 50 mg by mouth two (2) times a day. Yes Sandra, MD Martir   magnesium 250 mg tab Take  by mouth. Sandra, MD Martir   FLUoxetine (PROZAC) 10 mg capsule Take  by mouth daily. Martir Flores MD   phentermine (ADIPEX-P) 37.5 mg capsule Take 37.5 mg by mouth every morning. Sandra, MD Martir     Current Facility-Administered Medications   Medication Dose Route Frequency    levETIRAcetam in saline (iso-os) (KEPPRA) infusion 1,000 mg  1,000 mg IntraVENous Q12H    0.9% sodium chloride infusion  125 mL/hr IntraVENous CONTINUOUS    enoxaparin (LOVENOX) injection 40 mg  40 mg SubCUTAneous Q24H    insulin lispro (HUMALOG) injection   SubCUTAneous Q6H    piperacillin-tazobactam (ZOSYN) 3.375 g in 0.9% sodium chloride (MBP/ADV) 100 mL MBP  3.375 g IntraVENous Q8H    famotidine (PF) (PEPCID) 20 mg in sodium chloride 0.9% 10 mL injection  20 mg IntraVENous Q12H         Objective:   Vital Signs:    Visit Vitals  BP (!) 153/92   Pulse (!) 54   Temp 97.6 °F (36.4 °C)   Resp 13   Wt 55.2 kg (121 lb 11.1 oz)   SpO2 100%   BMI 21.56 kg/m²       O2 Device: Nasal cannula   O2 Flow Rate (L/min): 2 l/min   Temp (24hrs), Av.8 °F (36 °C), Min:96.3 °F (35.7 °C), Max:97.6 °F (36.4 °C)       Intake/Output:   Last shift:      No intake/output data recorded.     Last 3 shifts:  1901 -  0700  In: 100 [I.V.:100]  Out: -       Intake/Output Summary (Last 24 hours) at 2019 0927  Last data filed at 2019 0354  Gross per 24 hour   Intake 100 ml   Output    Net 100 ml       Last 3 Recorded Weights in this Encounter    19 0031   Weight: 55.2 kg (121 lb 11.1 oz)       Physical Exam:     General/Neurology: Awake, confused. Not following any commands. Moving all 4 limbs. DTR 2 + b/l ankle and bicep. Planters flexor. Head:   Normocephalic, without obvious abnormality, atraumatic. Eye:   EOM intact, no scleral icterus, no pallor, no cyanosis. Pupils dilated 4 mm but reactive. Nose:   No sinus tenderness. Throat:  Lips, mucosa, and tongue normal. No oral thrush. Neck:   Supple, symmetric. No lymphadenopathy. Trachea midline  Lung: Moderate air entry bilateral equal. No rales. No rhonchi. No wheezing. No stridors. No prolongded expiration. No accessory muscle use. Heart:   Regular rate & rhythm. S1 S2 present. No murmur. No JVD. Abdomen:  Soft. NT. ND. +BS. No masses. Extremities:  No pedal edema. No cyanosis. No clubbing. Pulses: 2+ and symmetric in DP. Capillary refill: normal  Lymphatic:  No cervical or supraclavicular palpable lymphadenopathy. Musculoskeletal: No joint swelling. No tenderness. Skin:   Color, texture, turgor normal. No rashes or lesions.        Data:       Recent Results (from the past 24 hour(s))   EKG, 12 LEAD, INITIAL    Collection Time: 04/02/19 12:36 AM   Result Value Ref Range    Ventricular Rate 68 BPM    Atrial Rate 68 BPM    P-R Interval 146 ms    QRS Duration 92 ms    Q-T Interval 412 ms    QTC Calculation (Bezet) 438 ms    Calculated P Axis 28 degrees    Calculated R Axis 21 degrees    Calculated T Axis 37 degrees    Diagnosis       Normal sinus rhythm  Normal ECG  No previous ECGs available     POC LACTIC ACID    Collection Time: 04/02/19 12:39 AM   Result Value Ref Range    Lactic Acid (POC) 2.29 (HH) 0.40 - 2.00 mmol/L   CBC WITH AUTOMATED DIFF    Collection Time: 04/02/19 12:40 AM   Result Value Ref Range    WBC 15.8 (H) 4.6 - 13.2 K/uL    RBC 4.58 4.20 - 5.30 M/uL    HGB 14.1 12.0 - 16.0 g/dL    HCT 42.5 35.0 - 45.0 %    MCV 92.8 74.0 - 97.0 FL    MCH 30.8 24.0 - 34.0 PG    MCHC 33.2 31.0 - 37.0 g/dL    RDW 13.7 11.6 - 14.5 %    PLATELET 599 (H) 424 - 420 K/uL    MPV 9.2 9.2 - 11.8 FL    NEUTROPHILS 82 (H) 40 - 73 %    LYMPHOCYTES 13 (L) 21 - 52 %    MONOCYTES 5 3 - 10 %    EOSINOPHILS 0 0 - 5 %    BASOPHILS 0 0 - 2 %    ABS. NEUTROPHILS 12.9 (H) 1.8 - 8.0 K/UL    ABS. LYMPHOCYTES 2.1 0.9 - 3.6 K/UL    ABS. MONOCYTES 0.7 0.05 - 1.2 K/UL    ABS. EOSINOPHILS 0.1 0.0 - 0.4 K/UL    ABS. BASOPHILS 0.0 0.0 - 0.1 K/UL    DF AUTOMATED     METABOLIC PANEL, COMPREHENSIVE    Collection Time: 04/02/19 12:40 AM   Result Value Ref Range    Sodium 144 136 - 145 mmol/L    Potassium 4.2 3.5 - 5.5 mmol/L    Chloride 114 (H) 100 - 108 mmol/L    CO2 20 (L) 21 - 32 mmol/L    Anion gap 10 3.0 - 18 mmol/L    Glucose 127 (H) 74 - 99 mg/dL    BUN 11 7.0 - 18 MG/DL    Creatinine 1.01 0.6 - 1.3 MG/DL    BUN/Creatinine ratio 11 (L) 12 - 20      GFR est AA >60 >60 ml/min/1.73m2    GFR est non-AA 60 (L) >60 ml/min/1.73m2    Calcium 8.9 8.5 - 10.1 MG/DL    Bilirubin, total 0.2 0.2 - 1.0 MG/DL    ALT (SGPT) 18 13 - 56 U/L    AST (SGOT) 18 15 - 37 U/L    Alk.  phosphatase 99 45 - 117 U/L    Protein, total 7.1 6.4 - 8.2 g/dL    Albumin 4.0 3.4 - 5.0 g/dL    Globulin 3.1 2.0 - 4.0 g/dL    A-G Ratio 1.3 0.8 - 1.7     LIPASE    Collection Time: 04/02/19 12:40 AM   Result Value Ref Range    Lipase 164 73 - 393 U/L   MAGNESIUM    Collection Time: 04/02/19 12:40 AM   Result Value Ref Range    Magnesium 2.4 1.6 - 2.6 mg/dL   SALICYLATE    Collection Time: 04/02/19 12:40 AM   Result Value Ref Range    Salicylate level 5.1 2.8 - 20.0 MG/DL   ACETAMINOPHEN    Collection Time: 04/02/19 12:40 AM   Result Value Ref Range    Acetaminophen level <2 (L) 10.0 - 30.0 ug/mL   CARDIAC PANEL,(CK, CKMB & TROPONIN)    Collection Time: 04/02/19 12:40 AM   Result Value Ref Range    CK 57 26 - 192 U/L    CK - MB <1.0 <3.6 ng/ml    CK-MB Index  0.0 - 4.0 %     CALCULATION NOT PERFORMED WHEN RESULT IS BELOW LINEAR LIMIT    Troponin-I, QT <0.02 0.0 - 0.045 NG/ML   ETHYL ALCOHOL    Collection Time: 04/02/19 12:40 AM   Result Value Ref Range    ALCOHOL(ETHYL),SERUM <3 0 - 3 MG/DL   HEMOGLOBIN A1C WITH EAG    Collection Time: 04/02/19 12:40 AM   Result Value Ref Range    Hemoglobin A1c 4.8 4.2 - 5.6 %    Est. average glucose Cannot be calculated mg/dL   URINALYSIS W/ RFLX MICROSCOPIC    Collection Time: 04/02/19  1:00 AM   Result Value Ref Range    Color YELLOW      Appearance CLEAR      Specific gravity 1.009 1.005 - 1.030      pH (UA) 6.5 5.0 - 8.0      Protein NEGATIVE  NEG mg/dL    Glucose NEGATIVE  NEG mg/dL    Ketone NEGATIVE  NEG mg/dL    Bilirubin NEGATIVE  NEG      Blood NEGATIVE  NEG      Urobilinogen 0.2 0.2 - 1.0 EU/dL    Nitrites NEGATIVE  NEG      Leukocyte Esterase NEGATIVE  NEG     DRUG SCREEN, URINE    Collection Time: 04/02/19  1:00 AM   Result Value Ref Range    BENZODIAZEPINES POSITIVE (A) NEG      BARBITURATES NEGATIVE  NEG      THC (TH-CANNABINOL) NEGATIVE  NEG      OPIATES NEGATIVE  NEG      PCP(PHENCYCLIDINE) NEGATIVE  NEG      COCAINE NEGATIVE  NEG      AMPHETAMINES NEGATIVE  NEG      METHADONE NEGATIVE  NEG      HDSCOM (NOTE)    HCG URINE, QL    Collection Time: 04/02/19  1:00 AM   Result Value Ref Range    HCG urine, QL NEGATIVE  NEG     LACTIC ACID    Collection Time: 04/02/19  3:00 AM   Result Value Ref Range    Lactic acid 2.0 0.4 - 2.0 MMOL/L   GLUCOSE, POC    Collection Time: 04/02/19  6:07 AM   Result Value Ref Range    Glucose (POC) 100 70 - 110 mg/dL   AMMONIA    Collection Time: 04/02/19  6:09 AM   Result Value Ref Range    Ammonia <10 (L) 11 - 32 UMOL/L         Chemistry Recent Labs     04/02/19  0040   *      K 4.2   *   CO2 20*   BUN 11   CREA 1.01   CA 8.9   MG 2.4   AGAP 10   BUCR 11*   AP 99   TP 7.1   ALB 4.0   GLOB 3.1   AGRAT 1.3        Lactic Acid Lactic acid   Date Value Ref Range Status   04/02/2019 2.0 0.4 - 2.0 MMOL/L Final     Recent Labs     04/02/19  0300   LAC 2.0        Liver Enzymes Protein, total Date Value Ref Range Status   04/02/2019 7.1 6.4 - 8.2 g/dL Final     Albumin   Date Value Ref Range Status   04/02/2019 4.0 3.4 - 5.0 g/dL Final     Globulin   Date Value Ref Range Status   04/02/2019 3.1 2.0 - 4.0 g/dL Final     A-G Ratio   Date Value Ref Range Status   04/02/2019 1.3 0.8 - 1.7   Final     AST (SGOT)   Date Value Ref Range Status   04/02/2019 18 15 - 37 U/L Final     Alk. phosphatase   Date Value Ref Range Status   04/02/2019 99 45 - 117 U/L Final     Recent Labs     04/02/19  0040   TP 7.1   ALB 4.0   GLOB 3.1   AGRAT 1.3   SGOT 18   AP 99        CBC w/Diff Recent Labs     04/02/19  0040   WBC 15.8*   RBC 4.58   HGB 14.1   HCT 42.5   *   GRANS 82*   LYMPH 13*   EOS 0        Cardiac Enzymes Lab Results   Component Value Date/Time    CPK 57 04/02/2019 12:40 AM    CKMB <1.0 04/02/2019 12:40 AM    CKND1  04/02/2019 12:40 AM     CALCULATION NOT PERFORMED WHEN RESULT IS BELOW LINEAR LIMIT    TROIQ <0.02 04/02/2019 12:40 AM        BNP No results found for: BNP, BNPP, XBNPT     Coagulation No results for input(s): PTP, INR, APTT in the last 72 hours. No lab exists for component: INREXT      Thyroid  No results found for: T4, T3U, TSH, TSHEXT    No results found for: T4     Urinalysis Lab Results   Component Value Date/Time    Color YELLOW 04/02/2019 01:00 AM    Appearance CLEAR 04/02/2019 01:00 AM    Specific gravity 1.009 04/02/2019 01:00 AM    pH (UA) 6.5 04/02/2019 01:00 AM    Protein NEGATIVE  04/02/2019 01:00 AM    Glucose NEGATIVE  04/02/2019 01:00 AM    Ketone NEGATIVE  04/02/2019 01:00 AM    Bilirubin NEGATIVE  04/02/2019 01:00 AM    Urobilinogen 0.2 04/02/2019 01:00 AM    Nitrites NEGATIVE  04/02/2019 01:00 AM    Leukocyte Esterase NEGATIVE  04/02/2019 01:00 AM        Micro  No results for input(s): SDES, CULT in the last 72 hours. No results for input(s): CULT in the last 72 hours.      ABG No results for input(s): PHI, PHI, POC2, PCO2I, PO2, PO2I, HCO3, HCO3I, FIO2, FIO2I in the last 72 hours. CT (Most Recent) (CT chest reviewed by me) Results from Hospital Encounter encounter on 04/02/19   CT HEAD WO CONT    Narrative EXAM: CT head    INDICATION: Seizures. COMPARISON: None. TECHNIQUE: Axial CT imaging of the head was performed without intravenous  contrast. Dose reduction techniques used: automated exposure control, adjustment  of the mAs and/or kVp according to patient size, and iterative reconstruction  techniques. _______________    FINDINGS:    BRAIN AND POSTERIOR FOSSA: The sulci, folia, ventricles and basal cisterns are  within normal limits for the patient's age. There is no intracranial hemorrhage,  mass effect, or midline shift. There are no areas of abnormal parenchymal  attenuation. EXTRA-AXIAL SPACES AND MENINGES: There are no abnormal extra-axial fluid  collections. CALVARIUM: Intact. SINUSES: There is a partially imaged right maxillary sinus opacity. OTHER: None.    _______________      Impression IMPRESSION:    No acute intracranial abnormality. Partially imaged right maxillary sinus opacity of uncertain acuity and current  significance. XR (Most Recent). CXR  reviewed by me and compared with previous CXR Results from [place] encounter on 04/02/19   XR CHEST PORT    Narrative ---------------------------------------------------------------------------  <<<<<<<<<           [facility]           >>>>>>>>>   ---------------------------------------------------------------------------    CLINICAL HISTORY:  Seizure. COMPARISON EXAMINATIONS:  None. ---  SINGLE FRONTAL VIEW OF THE CHEST  ---    The lungs and pleural spaces are clear. The mediastinum is unremarkable in  appearance. No significant osseous abnormalities are identified.        --------------    Impression Impression:  --------------    No active pulmonary disease.             IMPRESSION:   · Seizure, ?polypharmacy vs benzo withdrawal. · AMS, likely postictal   · Leucocytosis, likely due to stress  · Sinus bradycardia, improving  · Unknown PMHx but she was on several medications including lithium, diazepam, hydrocodone, codeine, quetiapine, fluoxetine and phentermine. · Code status: full       RECOMMENDATIONS:   Respiratory: no acute issues. Protecting airway. Aspiration precautions due to AMS. Keep SPO2 >=92%. HOB 30 degree elevation all the time. Aggressive pulmonary toileting. Aspiration precautions. Incentive spirometry. CVS: mild sinus roderick in 40's, now in 50's. Monitor. BP stable. ID: leucocytosis due to stress. No fever. UA negative. CXR negative. abd soft. No sign of infection. On empiric zosyn. If fever, blood cx. May d/c zosyn in 24-48 hrs if no sign of infection. Hematology/Oncology: no acute issues  Renal: no acute issues. Monitor renal function. GI/: pregnancy test negative. No acute issues  Endocrine: Monitor BS. SSI. Neurology: seen by Dr. Pratik Catherine. CT head nothing acute. Watch for benzo withdrawal. On keppra. Ativan prn. EEG now. Serial neuro check. Pain/Sedation: no acute issues. Skin/Wound: no acute issues  Electrolytes: Replace electrolytes per ICU electrolyte replacement protocol. IVF: NS at 125  Nutrition: npo while AMS  Prophylaxis: DVT Prophylaxis: lovenox. GI Prophylaxis: pepcid. Restraints: Wrist soft restraints for patient interfering with medical therapy/management and patient safety. Lines/Tubes: PIV    Will defer respective systems problem management to primary and other respective consultant and follow patient in ICU with primary and other medical team.  Further recommendations will be based on the patient's response to recommended treatment and results of the investigation ordered. Quality Care: PPI, DVT prophylaxis, HOB elevated, Infection control all reviewed and addressed. Care of plan d/w RN, RT, MDR. No family at bedside.      High complexity decision making was performed during the evaluation of this patient at high risk for decompensation with multiple organ involvement. Total critical care time spent rendering care exclusive of procedures: 33 minutes.          Felicita Moe MD  4/2/2019

## 2019-04-02 NOTE — CONSULTS
NEUROLOGY CONSULTATION NOTE    Patient: Deirdre Lowry MRN: [id number]  CSN: [id number]    YOB: 1975  Age: 37 y.o. Sex: female    DOA: 4/2/2019 LOS:  LOS: 0 days        Requesting Physician: Dr. Mounika Daley  Reason for Consultation: Seizure    HISTORY OF PRESENT ILLNESS:   Deirdre Lowry is a 54-year-old female who presented to ER with unresponsiveness and seizures. Apparently, the patient had a seizure at home and had another seizure while in the ER. The seizures were generalized tonic-clonic in nature and the last seizure was accompanied by urinary/bowel incontinence. The patient is confused and postictal, therefore an accurate history couldnt be obtained. Per the outside hospital charts, the patient had chronic low back pain and had a spinal cord stimulator placed in 2016, which was removed in December 2017 due to malfunction. She was on several medications including lithium, diazepam, hydrocodone, codeine, quetiapine, fluoxetine and phentermine. I received a call at around 2:30 AM from the ER and apparently, the patient ran out of some of these medications recently. Due to the recurrent seizures, she was given 2 mg of Ativan and was loaded with levetiracetam. Her head CT didnt show any acute events. PAST MEDICAL HISTORY:  Past Medical History:   Diagnosis Date    Kidney stones      PAST SURGICAL HISTORY:  No past surgical history on file. FAMILY HISTORY:  No family history on file.   SOCIAL HISTORY:  Social History     Socioeconomic History    Marital status:      Spouse name: Not on file    Number of children: Not on file    Years of education: Not on file    Highest education level: Not on file   Tobacco Use    Smoking status: Passive Smoke Exposure - Never Smoker    Smokeless tobacco: Never Used   Substance and Sexual Activity    Alcohol use: No     Frequency: Never     MEDICATIONS:  Current Facility-Administered Medications   Medication Dose Route Frequency    levETIRAcetam in saline (iso-os) (KEPPRA) infusion 1,000 mg  1,000 mg IntraVENous Q12H    0.9% sodium chloride infusion  125 mL/hr IntraVENous CONTINUOUS    enoxaparin (LOVENOX) injection 40 mg  40 mg SubCUTAneous Q24H    glucose chewable tablet 16 g  4 Tab Oral PRN    glucagon (GLUCAGEN) injection 1 mg  1 mg IntraMUSCular PRN    dextrose (D50W) injection syrg 12.5-25 g  25-50 mL IntraVENous PRN    acetaminophen (TYLENOL) tablet 650 mg  650 mg Oral Q6H PRN    ondansetron (ZOFRAN) injection 4 mg  4 mg IntraVENous Q6H PRN    LORazepam (ATIVAN) injection 1 mg  1 mg IntraVENous Q2H PRN    insulin lispro (HUMALOG) injection   SubCUTAneous Q6H    piperacillin-tazobactam (ZOSYN) 3.375 g in 0.9% sodium chloride (MBP/ADV) 100 mL MBP  3.375 g IntraVENous Q8H    famotidine (PF) (PEPCID) 20 mg in sodium chloride 0.9% 10 mL injection  20 mg IntraVENous Q12H     Prior to Admission medications    Medication Sig Start Date End Date Taking? Authorizing Provider   lithium carbonate 300 mg tablet Take 300 mg by mouth three (3) times daily. Yes Sandra, MD Martir   diphenoxylate-atropine (LOMOTIL) 2.5-0.025 mg per tablet Take  by mouth four (4) times daily as needed for Diarrhea. Yes Sandra, MD Martir   diazePAM (VALIUM) 5 mg tablet Take 5 mg by mouth every six (6) hours as needed for Anxiety. Yes Sandra, MD Martir   HYDROcodone-acetaminophen (NORCO)  mg tablet Take 1 Tab by mouth. Yes Sandra, MD Martir   codeine sulfate 30 mg tablet Take  by mouth every four (4) hours as needed. Yes Sandra, MD Martir   QUEtiapine (SEROQUEL) 100 mg tablet Take 50 mg by mouth two (2) times a day. Yes Martir Flores MD   magnesium 250 mg tab Take  by mouth. Martir Flores MD   FLUoxetine (PROZAC) 10 mg capsule Take  by mouth daily. Martir Flores MD   phentermine (ADIPEX-P) 37.5 mg capsule Take 37.5 mg by mouth every morning.     Sanrda, MD Martir       ALLERGIES:  No Known Allergies    Review of Systems  Unable to obtain the review of systems since the patient is unresponsive and confused. PHYSICAL EXAMINATION:     Visit Vitals  BP (!) 161/95   Pulse (!) 51   Temp 96.7 °F (35.9 °C)   Resp 11   Wt 55.2 kg (121 lb 11.1 oz)   SpO2 100%   BMI 21.56 kg/m²    O2 Flow Rate (L/min): 2 l/min O2 Device: Nasal cannula  GENERAL: Somnolent, confused, restless. HEENT: Moist mucous membranes, sclerae anicteric, scalp is atraumatic. CVS: Regular rate and rhythm, no murmurs or gallops. No carotid bruits. PULMONARY: Coarse sounds anteriorly. No wheezing. EXTREMITIES: Normal range of motion at all sites. No deformities. ABDOMEN: Soft, nontender. SKIN: No rashes or ecchymoses. Warm and dry. NEUROLOGIC: The patient is somnolent but arousable loud voice and noxious stimuli. She opens her eyes but does not follow the commands. Pupils are equal and reactive to light. It is difficult to assess the visual fields or facial sensation. Tongue looks like midline. She grew grimaces her face symmetrically to noxious stimuli. Shes moving both upper and lower extremities restlessly, weaker on upper extremities bilaterally. She doesnt give me a handgrip. Deep tendon reflexes are diminished at all sides. Toes show withdrawal response. Labs: Results:       Chemistry Recent Labs     04/02/19 0040   *      K 4.2   *   CO2 20*   BUN 11   CREA 1.01   CA 8.9   AGAP 10   BUCR 11*   AP 99   TP 7.1   ALB 4.0   GLOB 3.1   AGRAT 1.3      CBC w/Diff Recent Labs     04/02/19 0040   WBC 15.8*   RBC 4.58   HGB 14.1   HCT 42.5   *   GRANS 82*   LYMPH 13*   EOS 0      Cardiac Enzymes Recent Labs     04/02/19 0040   CPK 57   CKND1 CALCULATION NOT PERFORMED WHEN RESULT IS BELOW LINEAR LIMIT      Coagulation No results for input(s): PTP, INR, APTT in the last 72 hours.     No lab exists for component: INREXT    Lipid Panel No results found for: CHOL, CHOLPOCT, CHOLX, CHLST, CHOLV, 305844, HDL, LDL, LDLC, DLDLP, 841724, VLDLC, VLDL, TGLX, TRIGL, TRIGP, TGLPOCT, CHHD, CHHDX   BNP No results for input(s): BNPP in the last 72 hours. Liver Enzymes Recent Labs     04/02/19  0040   TP 7.1   ALB 4.0   AP 99   SGOT 18      Thyroid Studies No results found for: T4, T3U, TSH, TSHEXT       Radiology:  Ct Head Wo Cont    Result Date: 4/2/2019  EXAM: CT head INDICATION: Seizures. COMPARISON: None. TECHNIQUE: Axial CT imaging of the head was performed without intravenous contrast. Dose reduction techniques used: automated exposure control, adjustment of the mAs and/or kVp according to patient size, and iterative reconstruction techniques. _______________ FINDINGS: BRAIN AND POSTERIOR FOSSA: The sulci, folia, ventricles and basal cisterns are within normal limits for the patient's age. There is no intracranial hemorrhage, mass effect, or midline shift. There are no areas of abnormal parenchymal attenuation. EXTRA-AXIAL SPACES AND MENINGES: There are no abnormal extra-axial fluid collections. CALVARIUM: Intact. SINUSES: There is a partially imaged right maxillary sinus opacity. OTHER: None. _______________     IMPRESSION: No acute intracranial abnormality. Partially imaged right maxillary sinus opacity of uncertain acuity and current significance. Xr Chest Port    Result Date: 4/2/2019  --------------------------------------------------------------------------- <<<<<<<<<           [facility]           >>>>>>>>> --------------------------------------------------------------------------- CLINICAL HISTORY:  Seizure. COMPARISON EXAMINATIONS:  None. ---  SINGLE FRONTAL VIEW OF THE CHEST  --- The lungs and pleural spaces are clear. The mediastinum is unremarkable in appearance. No significant osseous abnormalities are identified.  --------------    Impression: -------------- No active pulmonary disease.     ASSESSMENT/IMPRESSION:  The clinical presentation is most suggestive of provoked seizures In the setting of polypharmacy, possible medication side effects or withdrawal. This may be due to benzo withdrawal as well. It is reasonable to keep the patient on levetiracetam for now until she wakes up and be obtained for history. It is reasonable to obtain an EEG and corollary history as well. RECOMMENDATIONS:  1. EEG  2. Continue levetiracetam 1000 mg BID, IV or PO for now  3. Serial neuro checks  4. Supportive treatment with IV fluids and treatment of infection if any  5. Check serum lithium level and B12 levels. I will follow the patient.  Please do not hesitate to return with any questions.    ------------------------------------  Mark Guerra MD  4/2/2019  7:51 AM

## 2019-04-02 NOTE — ED TRIAGE NOTES
Pt arrived via EMS c/c seizure, pt alert to pain only upon arrival, pt spouse founf pt standing and twitching, pt has incontinence prior to arrival, pt spouse stated that she may have taken too much flexeril and has no hx of seizures.

## 2019-04-02 NOTE — PROGRESS NOTES
Reason for Admission:  C/o seizure activity RRAT Score:   low Plan for utilizing home health:      TBD Current Advanced Directive/Advance Care Plan:not on chart   Will inform physician during IDR's Likelihood of Readmission:  no                      
Transition of Care Plan:  Chart reviewed noted from ED notes pt arrived via EMS spouse reported finding pt standing and twitching with episode of incontinence, no prior hx of seizure activity, spouse believes pt may have taken too much flexeril,CT head normal,pt does take multiple medications which could have lead to seizure like movements, cm will cont to review case, attend IDR's and initiate d/c planning. Care Management Interventions Current Support Network: Lives with Spouse

## 2019-04-02 NOTE — PROGRESS NOTES
0730-Bedside and Verbal shift change report given to Missael Wayne RN (oncoming nurse) by Martha Obando RN (offgoing nurse). Report included the following information SBAR, Kardex, Intake/Output, MAR and Recent Results. 0740- Initial shift assessment complete. Dr Maryam Bradford at bedside. Patient starting to wake up, thrashing around bed, confused, not responding verbally, but moaning. Orders received for restraints to protect lines. Will continue to monitor. Seizure precautions in place. Per MD he will order an EEG. 1200-Reassessment complete, see EMR for changes. 1600- Reassessment complete, when stimulated patient started sitting up and pulling at restraints. Patient responds \"Ofelia\" when asked her name. Able to get patient up lay back down. Pt Unable to answer any other questions. Follows simple commands at times. Patient had several episodes of unmeasurable urine incontinence. Attempted to place external purwick, but patient moves legs too much for it to stay in place. 1930-Bedside and Verbal shift change report given to Martha Obando RN (oncoming nurse) by Missael Wayne RN (offgoing nurse). Report included the following information SBAR, Kardex, MAR and Cardiac Rhythm SR with PVC.

## 2019-04-02 NOTE — PROGRESS NOTES
Hospitalist Progress Note Patient: Sun Murray MRN: [id number]  CSN: [id number] YOB: 1975  Age: 37 y.o. Sex: female DOA: 4/2/2019 LOS:  LOS: 0 days Patient Active Problem List  
Diagnosis Code  Status epilepticus ([facility] G40.901  Polypharmacy Z79.899  Drug withdrawal ([facility] [id number]  New onset seizure ([facility] R56.9 Admitted 4/2 for status epilepticus Found seizing at home-per  Multiple meds in her room, for various ailments including lithium, muscle relaxers, codeine, seroquel No hx of seizures CT head unremarkable Given ativan, then keppra in ER Not able to participate in ROS or history as somnolent Will need psych eval after awakens H&P dictated Admit to ICU Neurology consult

## 2019-04-03 LAB
GLUCOSE BLD STRIP.AUTO-MCNC: 131 MG/DL (ref 70–110)
GLUCOSE BLD STRIP.AUTO-MCNC: 88 MG/DL (ref 70–110)
GLUCOSE BLD STRIP.AUTO-MCNC: 93 MG/DL (ref 70–110)

## 2019-04-03 PROCEDURE — 74011250637 HC RX REV CODE- 250/637: Performed by: HOSPITALIST

## 2019-04-03 PROCEDURE — 74011000250 HC RX REV CODE- 250: Performed by: HOSPITALIST

## 2019-04-03 PROCEDURE — 74011250636 HC RX REV CODE- 250/636: Performed by: HOSPITALIST

## 2019-04-03 PROCEDURE — 74011000258 HC RX REV CODE- 258: Performed by: HOSPITALIST

## 2019-04-03 PROCEDURE — 65660000000 HC RM CCU STEPDOWN

## 2019-04-03 PROCEDURE — 77010033678 HC OXYGEN DAILY

## 2019-04-03 PROCEDURE — 82962 GLUCOSE BLOOD TEST: CPT

## 2019-04-03 PROCEDURE — 93005 ELECTROCARDIOGRAM TRACING: CPT

## 2019-04-03 PROCEDURE — 74011250637 HC RX REV CODE- 250/637: Performed by: PSYCHIATRY & NEUROLOGY

## 2019-04-03 RX ORDER — DIVALPROEX SODIUM 500 MG/1
500 TABLET, EXTENDED RELEASE ORAL DAILY
Status: DISCONTINUED | OUTPATIENT
Start: 2019-04-03 | End: 2019-04-04 | Stop reason: HOSPADM

## 2019-04-03 RX ORDER — LORAZEPAM 2 MG/ML
INJECTION INTRAMUSCULAR
Status: DISPENSED
Start: 2019-04-03 | End: 2019-04-04

## 2019-04-03 RX ORDER — LORAZEPAM 1 MG/1
1 TABLET ORAL
Status: DISCONTINUED | OUTPATIENT
Start: 2019-04-03 | End: 2019-04-04

## 2019-04-03 RX ORDER — LORAZEPAM 2 MG/ML
2 INJECTION INTRAMUSCULAR
Status: DISCONTINUED | OUTPATIENT
Start: 2019-04-03 | End: 2019-04-04

## 2019-04-03 RX ORDER — LORAZEPAM 1 MG/1
2 TABLET ORAL
Status: DISCONTINUED | OUTPATIENT
Start: 2019-04-03 | End: 2019-04-04

## 2019-04-03 RX ORDER — LORAZEPAM 2 MG/ML
3 INJECTION INTRAMUSCULAR
Status: DISCONTINUED | OUTPATIENT
Start: 2019-04-03 | End: 2019-04-04

## 2019-04-03 RX ORDER — LORAZEPAM 2 MG/ML
1 INJECTION INTRAMUSCULAR
Status: DISCONTINUED | OUTPATIENT
Start: 2019-04-03 | End: 2019-04-04

## 2019-04-03 RX ADMIN — DIVALPROEX SODIUM 500 MG: 500 TABLET, EXTENDED RELEASE ORAL at 09:35

## 2019-04-03 RX ADMIN — SODIUM CHLORIDE 125 ML/HR: 900 INJECTION, SOLUTION INTRAVENOUS at 01:16

## 2019-04-03 RX ADMIN — FOLIC ACID: 5 INJECTION, SOLUTION INTRAMUSCULAR; INTRAVENOUS; SUBCUTANEOUS at 13:29

## 2019-04-03 RX ADMIN — FAMOTIDINE 20 MG: 10 INJECTION, SOLUTION INTRAVENOUS at 09:35

## 2019-04-03 RX ADMIN — LEVETIRACETAM 1000 MG: 10 INJECTION INTRAVENOUS at 01:16

## 2019-04-03 RX ADMIN — ACETAMINOPHEN 650 MG: 325 TABLET ORAL at 13:29

## 2019-04-03 RX ADMIN — LORAZEPAM 1 MG: 1 TABLET ORAL at 19:35

## 2019-04-03 RX ADMIN — ENOXAPARIN SODIUM 40 MG: 40 INJECTION SUBCUTANEOUS at 06:07

## 2019-04-03 RX ADMIN — PIPERACILLIN AND TAZOBACTAM 3.38 G: 3; .375 INJECTION, POWDER, FOR SOLUTION INTRAVENOUS at 06:07

## 2019-04-03 RX ADMIN — FAMOTIDINE 20 MG: 10 INJECTION, SOLUTION INTRAVENOUS at 20:42

## 2019-04-03 NOTE — PROGRESS NOTES
Hospitalist Progress Note Patient: Kenyetta Cannon MRN: [id number]  CSN: [id number] YOB: 1975  Age: 37 y.o. Sex: female DOA: 4/2/2019 LOS:  LOS: 1 day Chief Complaint: 
 
Status epilepticus Assessment/Plan New onset seizures with status epillepticu-now awake and interactive but weakened and slurred speech Seen by neuro-has abnl EEG so needs depakot as anticonvulsant as per neurology-will not be able to drive for now-has to have home meds reasessed-she is on lithium and xanax, and was on seroquel to help sleep-she ran out of [facility] and took 3 of these to help her sleep Polypharmacy-many meds, with some taken in advance to aide symptoms, but not in suicidal ideation Ok to transfer to tele Needs PT assessment She has PCP appt tomorrow-this physician prescribes her depression meds-will see if close appt can be kept but allow her to stay in hospital another 24 hrs due to severe illness and monitoring needed for 24 hrs more Labs reviewed Dicussed all with patient Can stop IVF and start oral diet Disposition : 
Patient Active Problem List  
Diagnosis Code  Status epilepticus ([facility] G40.901  Polypharmacy Z79.899  Drug withdrawal ([facility] [id number]  New onset seizure ([facility] R56.9 Subjective: 
 
Feels shaky Cannot use phone as cannot remember her passcode and feels her memory is poor Denies HA, nausea, CP Review of systems: 
 
Constitutional: denies fevers, chills Respiratory: denies SOB Cardiovascular: denies palpitations Gastrointestinal: denies  vomiting, diarrhea Vital signs/Intake and Output: 
Visit Vitals /75 (BP 1 Location: Right arm, BP Patient Position: At rest) Pulse 62 Temp 97.3 °F (36.3 °C) Resp 16 Wt 55.2 kg (121 lb 11.1 oz) SpO2 100% BMI 21.56 kg/m² Current Shift:  No intake/output data recorded. Last three shifts:  04/01 1901 - 04/03 0700 In: 2364.6 [I.V.:2364.6] Out: 1 Exam: General: Well developed, alert, NAD, OX3 Head/Neck: NCAT, supple, No masses, No lymphadenopathy CVS:Regular rate and rhythm, no M/R/G, S1/S2 heard, no thrill Lungs:Clear to auscultation bilaterally, no wheezes, rhonchi, or rales Abdomen: Soft, Nontender, No distention, Normal Bowel sounds, No hepatomegaly Extremities: No C/C/E, pulses palpable 2+ Neuro:grossly normal , follows commands, weak, tremulous Psych:appropriate Labs: Results:  
   
Chemistry Recent Labs 04/02/19 0040 *   
K 4.2 * CO2 20* BUN 11  
CREA 1.01  
CA 8.9 AGAP 10 BUCR 11* AP 99  
TP 7.1 ALB 4.0  
GLOB 3.1 AGRAT 1.3  
  
CBC w/Diff Recent Labs 04/02/19 0040 WBC 15.8*  
RBC 4.58 HGB 14.1 HCT 42.5 * GRANS 82* LYMPH 13* EOS 0 Cardiac Enzymes Recent Labs 04/02/19 0040 CPK 57 CKND1 CALCULATION NOT PERFORMED WHEN RESULT IS BELOW LINEAR LIMIT Coagulation No results for input(s): PTP, INR, APTT in the last 72 hours. No lab exists for component: INREXT Lipid Panel No results found for: CHOL, CHOLPOCT, CHOLX, CHLST, CHOLV, 223034, HDL, LDL, LDLC, DLDLP, 666061, VLDLC, VLDL, TGLX, TRIGL, TRIGP, TGLPOCT, CHHD, CHHDX  
BNP No results for input(s): BNPP in the last 72 hours. Liver Enzymes Recent Labs 04/02/19 0040 TP 7.1 ALB 4.0 AP 99 SGOT 18 Thyroid Studies Lab Results Component Value Date/Time TSH 1.90 04/02/2019 12:40 AM  
    
Procedures/imaging: see electronic medical records for all procedures/Xrays and details which were not copied into this note but were reviewed prior to creation of Plan Sunita Gloria MD

## 2019-04-03 NOTE — PROGRESS NOTES
Bedside shift change report given to Kandi Zarate RN and Mark Draper RN (oncoming nurse) by Alley Marie RN (offgoing nurse). Report included the following information SBAR, Kardex, ED Summary, Intake/Output, MAR, Accordion, Recent Results and Alarm Parameters .

## 2019-04-03 NOTE — PROGRESS NOTES
[facility] Pulmonary, Critical Care, and Sleep Medicine Name: Isaiah Colon MRN: [id number] : 1975 Hospital: [facility] [place] Date: 4/3/2019 PCCM Note Consult requesting physician: Dr. Hussain Borrero Reason for Consult: seizure Subjective/History of Present Illness:  
 
Patient is a 37 y.o. female with PMHx significant for chronic LBP, spinal cord stimulator removal 2017, admitted with seizure likely due to polypharmacy vs benzo withdrawal.  
 
4/3/2019  
eeg abnormal, keppra changed to depakote No fever chills cough cp Mental status normalized No other overnight issues No Known Allergies Past Medical History:  
Diagnosis Date  Kidney stones No past surgical history on file. Social History Tobacco Use  Smoking status: Passive Smoke Exposure - Never Smoker  Smokeless tobacco: Never Used Substance Use Topics  Alcohol use: No  
  Frequency: Never No family history on file. Prior to Admission medications Medication Sig Start Date End Date Taking? Authorizing Provider  
lithium carbonate 300 mg tablet Take 300 mg by mouth three (3) times daily. Yes Sandra, MD Martir  
diphenoxylate-atropine (LOMOTIL) 2.5-0.025 mg per tablet Take  by mouth four (4) times daily as needed for Diarrhea. Yes Martir Flores MD  
diazePAM (VALIUM) 5 mg tablet Take 5 mg by mouth every six (6) hours as needed for Anxiety. Yes Martir Flores MD  
HYDROcodone-acetaminophen (NORCO)  mg tablet Take 1 Tab by mouth. Yes Martir Flores MD  
codeine sulfate 30 mg tablet Take  by mouth every four (4) hours as needed. Yes Martir Flores MD  
QUEtiapine (SEROQUEL) 100 mg tablet Take 50 mg by mouth two (2) times a day. Yes Martir Flores MD  
magnesium 250 mg tab Take  by mouth. Martir Flores MD  
FLUoxetine (PROZAC) 10 mg capsule Take  by mouth daily.     Martir Flores MD  
 phentermine (ADIPEX-P) 37.5 mg capsule Take 37.5 mg by mouth every morning. Other, MD Martir  
 
Current Facility-Administered Medications Medication Dose Route Frequency  divalproex ER (DEPAKOTE ER) 24 hour tablet 500 mg  500 mg Oral DAILY  enoxaparin (LOVENOX) injection 40 mg  40 mg SubCUTAneous Q24H  
 insulin lispro (HUMALOG) injection   SubCUTAneous Q6H  
 famotidine (PF) (PEPCID) 20 mg in sodium chloride 0.9% 10 mL injection  20 mg IntraVENous Q12H Objective:  
Vital Signs:   
Visit Vitals BP (!) 166/100 Pulse 75 Temp 98.2 °F (36.8 °C) Resp 9 Wt 55.2 kg (121 lb 11.1 oz) SpO2 100% BMI 21.56 kg/m² O2 Device: Nasal cannula O2 Flow Rate (L/min): 2 l/min Temp (24hrs), Av.8 °F (36.6 °C), Min:97.4 °F (36.3 °C), Max:98.3 °F (36.8 °C) Intake/Output:  
Last shift:      No intake/output data recorded. Last 3 shifts:  1901 -  0700 In: 2364.6 [I.V.:2364.6] Out: 1 Intake/Output Summary (Last 24 hours) at 4/3/2019 2883 Last data filed at 4/3/2019 4418 Gross per 24 hour Intake 1797.92 ml Output 1 ml Net 1796.92 ml Last 3 Recorded Weights in this Encounter 19 0031 Weight: 55.2 kg (121 lb 11.1 oz) Physical Exam:  
 
General/Neurology: Aaox3, no focal deficit Head:   Normocephalic, without obvious abnormality, atraumatic. Eye:   EOM intact, no scleral icterus, no pallor, no cyanosis. Pupils dilated 4 mm but reactive. Lung: Moderate air entry bilateral equal. No rales. No rhonchi. No wheezing. No stridors. No prolongded expiration. No accessory muscle use. Heart:   Regular rate & rhythm. S1 S2 present. No murmur. No JVD. Abdomen:  Soft. NT. ND. +BS. No masses. Extremities:  No pedal edema. No cyanosis. No clubbing. Pulses: 2+ and symmetric in DP. Capillary refill: normal 
Lymphatic:  No cervical or supraclavicular palpable lymphadenopathy. Data:  
   
Recent Results (from the past 24 hour(s)) GLUCOSE, POC  
 Collection Time: 04/02/19 12:35 PM  
Result Value Ref Range Glucose (POC) 100 70 - 110 mg/dL GLUCOSE, POC Collection Time: 04/02/19  5:51 PM  
Result Value Ref Range Glucose (POC) 119 (H) 70 - 110 mg/dL GLUCOSE, POC Collection Time: 04/02/19 11:51 PM  
Result Value Ref Range Glucose (POC) 120 (H) 70 - 110 mg/dL GLUCOSE, POC Collection Time: 04/03/19  5:10 AM  
Result Value Ref Range Glucose (POC) 93 70 - 110 mg/dL Chemistry Recent Labs 04/02/19 
0040 *   
K 4.2 * CO2 20* BUN 11  
CREA 1.01  
CA 8.9 MG 2.4 AGAP 10 BUCR 11* AP 99  
TP 7.1 ALB 4.0  
GLOB 3.1 AGRAT 1.3 Lactic Acid Lactic acid Date Value Ref Range Status 04/02/2019 2.0 0.4 - 2.0 MMOL/L Final  
 
Recent Labs 04/02/19 
0300 LAC 2.0 Liver Enzymes Protein, total  
Date Value Ref Range Status 04/02/2019 7.1 6.4 - 8.2 g/dL Final  
 
Albumin Date Value Ref Range Status 04/02/2019 4.0 3.4 - 5.0 g/dL Final  
 
Globulin Date Value Ref Range Status 04/02/2019 3.1 2.0 - 4.0 g/dL Final  
 
A-G Ratio Date Value Ref Range Status 04/02/2019 1.3 0.8 - 1.7   Final  
 
AST (SGOT) Date Value Ref Range Status 04/02/2019 18 15 - 37 U/L Final  
 
Alk. phosphatase Date Value Ref Range Status 04/02/2019 99 45 - 117 U/L Final  
 
Recent Labs 04/02/19 
0040 TP 7.1 ALB 4.0  
GLOB 3.1 AGRAT 1.3 SGOT 18  
AP 99 CBC w/Diff Recent Labs 04/02/19 0040 WBC 15.8*  
RBC 4.58 HGB 14.1 HCT 42.5 * GRANS 82* LYMPH 13* EOS 0 Cardiac Enzymes No results found for: CPK, CK, CKMMB, CKMB, RCK3, CKMBT, CKNDX, CKND1, VALDEZ, TROPT, TROIQ, MORENA, TROPT, TNIPOC, BNP, BNPP  
 
BNP No results found for: BNP, BNPP, XBNPT Coagulation No results for input(s): PTP, INR, APTT in the last 72 hours. No lab exists for component: INREXT, INREXT Thyroid  Lab Results Component Value Date/Time  TSH 1.90 04/02/2019 12:40 AM  
   
 No results found for: T4  
 
Urinalysis Lab Results Component Value Date/Time Color YELLOW 04/02/2019 01:00 AM  
 Appearance CLEAR 04/02/2019 01:00 AM  
 Specific gravity 1.009 04/02/2019 01:00 AM  
 pH (UA) 6.5 04/02/2019 01:00 AM  
 Protein NEGATIVE  04/02/2019 01:00 AM  
 Glucose NEGATIVE  04/02/2019 01:00 AM  
 Ketone NEGATIVE  04/02/2019 01:00 AM  
 Bilirubin NEGATIVE  04/02/2019 01:00 AM  
 Urobilinogen 0.2 04/02/2019 01:00 AM  
 Nitrites NEGATIVE  04/02/2019 01:00 AM  
 Leukocyte Esterase NEGATIVE  04/02/2019 01:00 AM  
  
 
Micro  No results for input(s): SDES, CULT in the last 72 hours. No results for input(s): CULT in the last 72 hours. ABG No results for input(s): PHI, PHI, POC2, PCO2I, PO2, PO2I, HCO3, HCO3I, FIO2, FIO2I in the last 72 hours. CT (Most Recent) (CT chest reviewed by me) Results from [facility] Encounter encounter on 04/02/19 CT HEAD WO CONT Narrative EXAM: CT head INDICATION: Seizures. COMPARISON: None. TECHNIQUE: Axial CT imaging of the head was performed without intravenous 
contrast. Dose reduction techniques used: automated exposure control, adjustment 
of the mAs and/or kVp according to patient size, and iterative reconstruction 
techniques. _______________ FINDINGS: 
 
BRAIN AND POSTERIOR FOSSA: The sulci, folia, ventricles and basal cisterns are 
within normal limits for the patient's age. There is no intracranial hemorrhage, 
mass effect, or midline shift. There are no areas of abnormal parenchymal 
attenuation. EXTRA-AXIAL SPACES AND MENINGES: There are no abnormal extra-axial fluid 
collections. CALVARIUM: Intact. SINUSES: There is a partially imaged right maxillary sinus opacity. OTHER: None. 
 
_______________ Impression IMPRESSION: 
 
No acute intracranial abnormality. Partially imaged right maxillary sinus opacity of uncertain acuity and current 
significance. XR (Most Recent). CXR  reviewed by me and compared with previous CXR Results from [facility] Encounter encounter on 04/02/19 XR CHEST PORT Narrative --------------------------------------------------------------------------- 
<<<<<<<<<           [facility]  [facility]           >>>>>>>>>  
--------------------------------------------------------------------------- CLINICAL HISTORY:  Seizure. COMPARISON EXAMINATIONS:  None. ---  SINGLE FRONTAL VIEW OF THE CHEST  --- The lungs and pleural spaces are clear. The mediastinum is unremarkable in 
appearance. No significant osseous abnormalities are identified.   
 
 
-------------- Impression Impression: 
-------------- No active pulmonary disease. IMPRESSION:  
· Seizure, ?polypharmacy vs benzo withdrawal.  
· AMS, likely postictal  
· Leucocytosis, likely due to stress · Sinus bradycardia, resolved · Unknown PMHx but she was on several medications including lithium, diazepam, hydrocodone, codeine, quetiapine, fluoxetine and phentermine. · Hx migraine HA · Code status: full RECOMMENDATIONS:  
Respiratory: no acute issues. Protecting airway. Aspiration precautions due to AMS. Keep SPO2 >=92%. HOB 30 degree elevation all the time. Aggressive pulmonary toileting. Aspiration precautions. Incentive spirometry. CVS: mild sinus roderick, resolved. ID: leucocytosis due to stress. No fever. UA negative. CXR negative. abd soft. No sign of infection. On empiric zosyn. If fever, blood cx. Repeat cbc. May d/c zosyn in 24-48 hrs if no sign of infection. Hematology/Oncology: no acute issues Renal: no acute issues. Monitor renal function. GI/: pregnancy test negative. No acute issues Endocrine: Monitor BS. SSI. Neurology: seen by Dr. Zhen Miranda. CT head nothing acute. Watch for benzo withdrawal. Mental status normalized. keppra changed to depaoke with abnormal eeg and hx of migraine HA. Pain/Sedation: no acute issues. Skin/Wound: no acute issues Electrolytes: Replace electrolytes per ICU electrolyte replacement protocol. IVF: none Nutrition: start po diet Prophylaxis: DVT Prophylaxis: lovenox. GI Prophylaxis: pepcid. Restraints: none Lines/Tubes: PIV Will defer respective systems problem management to primary and other respective consultant and follow patient in ICU with primary and other medical team. 
Further recommendations will be based on the patient's response to recommended treatment and results of the investigation ordered. Quality Care: PPI, DVT prophylaxis, HOB elevated, Infection control all reviewed and addressed. Care of plan d/w RN, RT, MDR. No family at bedside. Moderate complexity decision making was performed during the evaluation of this patient at high risk for decompensation with multiple organ involvement. Ok to transfer to medical floor. Once transferred, PCCM will sign off. Call us with any questions. Adriana Manuel MD 
4/3/2019

## 2019-04-03 NOTE — PROGRESS NOTES
4/3/2019 PT note: consult received and chart reviewed. Events of the day noted and spoke with nurse Yuri patino. Will hold PT evaluation until tomorrow hopefully pt more stable then. Nurse aware of above. Thank you.   
Dena Ferrari, PT

## 2019-04-03 NOTE — PROGRESS NOTES
conducted a Follow up consultation and Spiritual Assessment for Aminta Welch, who is a 37 y.o.,female.  followed up on patient and she was wide awake and oriented. She told her story of why she is here but does not remember where it  
happened and how she came here. She says she still has a headache  
but  is a lot better. She had a hearty laugh when my pager went off The  provided the following Interventions: 
Continued the relationship of care and support. Listened empathically. Offered assurance of continued prayer on patients behalf. Chart reviewed. The following outcomes were achieved: 
Patient expressed gratitude for pastoral care visit. Assessment: 
There are no further spiritual or [employer] issues which require Spiritual Care Services interventions at this time. Plan: 
Chaplains will continue to follow and will provide pastoral care on an as needed/requested basis.  recommends bedside caregivers page  on duty if patient shows signs of acute spiritual or emotional distress. Sister Doc Aranda, [place], [address]  Spiritual Care [phone]

## 2019-04-03 NOTE — PROGRESS NOTES
Bedside and Verbal shift change report given to Zan Lucas RN (oncoming nurse) by Gentry Waller RN (offgoing nurse). Report included the following information SBAR, Intake/Output, MAR, Recent Results, Cardiac Rhythm NSR/ roderick and Quality Measures.

## 2019-04-03 NOTE — PROGRESS NOTES
NEUROLOGY PROGRESS NOTE Patient: Marco Carrion        Sex: female          DOA: 4/2/2019 YOB: 1975      Age:  37 y.o.         LOS: 1 day Identification: 
05-SAMEER-EDK female who presented with unresponsiveness and seizures. SUBJECTIVE:  
Pt is awake and talking. She admits taking Xanax everyday, but missed it 3 days. She admits taking chronic pain meds but does not remember all of them. Her EEG was abnormal. She says she has migraines as well. REVIEW OF SYSTEMS: Denies chest pain, abdominal pain, nausea or vomiting. No fever or chills. OBJECTIVE:  
  
Visit Vitals BP (!) 158/95 Pulse 69 Temp 98.2 °F (36.8 °C) Resp 13 Wt 55.2 kg (121 lb 11.1 oz) SpO2 100% BMI 21.56 kg/m² Physical Exam: 
GEN: Alert, NAD 
EYES: conjunctiva normal, lids with out lesions HEENT: MMM. ABDOMEN: Soft, non-tender. EXTREMITIES: No edema cyanosis SKIN: no rashes or skin breakdown, no nodules NEURO: Alert, oriented to self and hospital. Speech is fluent. Cranials: Face is symmetric. Smiles symmetrically. EOMI, VFF. Tongue is midline. Motor: Full strength at all sites. No pronator drift. Sensory: Intact to crude touch on all four. Coordination: Intact with no dysmetria during FNF. Current Facility-Administered Medications Medication Dose Route Frequency  levETIRAcetam in saline (iso-os) (KEPPRA) infusion 1,000 mg  1,000 mg IntraVENous Q12H  
 0.9% sodium chloride infusion  125 mL/hr IntraVENous CONTINUOUS  
 enoxaparin (LOVENOX) injection 40 mg  40 mg SubCUTAneous Q24H  
 glucose chewable tablet 16 g  4 Tab Oral PRN  
 glucagon (GLUCAGEN) injection 1 mg  1 mg IntraMUSCular PRN  
 dextrose (D50W) injection syrg 12.5-25 g  25-50 mL IntraVENous PRN  
 acetaminophen (TYLENOL) tablet 650 mg  650 mg Oral Q6H PRN  
 ondansetron (ZOFRAN) injection 4 mg  4 mg IntraVENous Q6H PRN  
 LORazepam (ATIVAN) injection 1 mg  1 mg IntraVENous Q2H PRN  
  insulin lispro (HUMALOG) injection   SubCUTAneous Q6H  
 piperacillin-tazobactam (ZOSYN) 3.375 g in 0.9% sodium chloride (MBP/ADV) 100 mL MBP  3.375 g IntraVENous Q8H  
 famotidine (PF) (PEPCID) 20 mg in sodium chloride 0.9% 10 mL injection  20 mg IntraVENous Q12H Laboratory Recent Results (from the past 24 hour(s)) GLUCOSE, POC Collection Time: 04/02/19 12:35 PM  
Result Value Ref Range Glucose (POC) 100 70 - 110 mg/dL GLUCOSE, POC Collection Time: 04/02/19  5:51 PM  
Result Value Ref Range Glucose (POC) 119 (H) 70 - 110 mg/dL GLUCOSE, POC Collection Time: 04/02/19 11:51 PM  
Result Value Ref Range Glucose (POC) 120 (H) 70 - 110 mg/dL GLUCOSE, POC Collection Time: 04/03/19  5:10 AM  
Result Value Ref Range Glucose (POC) 93 70 - 110 mg/dL Radiology: 
Ct Head Wo Cont Result Date: 4/2/2019 EXAM: CT head INDICATION: Seizures. COMPARISON: None. TECHNIQUE: Axial CT imaging of the head was performed without intravenous contrast. Dose reduction techniques used: automated exposure control, adjustment of the mAs and/or kVp according to patient size, and iterative reconstruction techniques. _______________ FINDINGS: BRAIN AND POSTERIOR FOSSA: The sulci, folia, ventricles and basal cisterns are within normal limits for the patient's age. There is no intracranial hemorrhage, mass effect, or midline shift. There are no areas of abnormal parenchymal attenuation. EXTRA-AXIAL SPACES AND MENINGES: There are no abnormal extra-axial fluid collections. CALVARIUM: Intact. SINUSES: There is a partially imaged right maxillary sinus opacity. OTHER: None. _______________ IMPRESSION: No acute intracranial abnormality. Partially imaged right maxillary sinus opacity of uncertain acuity and current significance. [facility] Result Date: 4/2/2019 
--------------------------------------------------------------------------- <<<<<<<<<           [facility]           >>>>>>>>> --------------------------------------------------------------------------- CLINICAL HISTORY:  Seizure. COMPARISON EXAMINATIONS:  None. ---  SINGLE FRONTAL VIEW OF THE CHEST  --- The lungs and pleural spaces are clear. The mediastinum is unremarkable in appearance. No significant osseous abnormalities are identified.  -------------- Impression: -------------- No active pulmonary disease. ASSESSMENT/IMPRESSION:  
Possibly provoked seizures in the setting of benzo withdrawal. However, her EEG was abnormal, showing interictal discharges on the right paracentral region. Therefore, she needs to be on an antiepileptic medication. Given her history of migraines, she may benefit from Depakote. PLAN/RECOMMENDATIONS: 
1. Switch levetiracetam to Depakote ER 500mg daily 2. Limit benzos and opioids. Neurology signs off. I will follow the patient as outpatient. Please do not hesitate to return with any questions.  
 
Signed: 
Meryle Pearson, MD 
4/3/2019 
7:33 AM

## 2019-04-03 NOTE — PROGRESS NOTES
1915 Report from [address] Assessment complete, patient only responding to noxious stimuli, restraints in place, seizure precautions remain, patient giving the name \"Ofelia\" [phone] Patient very agitated, trying to get out of bed, pulling on gown and sheet, given 1 mg PRN ativan 
0000 Reassessment, changes noted in chart 
0400 Reassessment, no changes 0781 Went in room to give patient antibiotics, patient more alert, was able to focus eyes, answer questions correctly, oriented to person, place, confused about situation, knows she's in the hospital but unsure why. Called her  and updated him on the change, patient spoke to her  on the phone as well. Restraints removed, patient shows no signs of agitation, agrees not to pull on lines. Patient had home medications in her bag, removed, this RN took meds to pharmacy for verification. 1200 [facility] RN arrived, will be assigned to this patient again today, was at bedside with patient, given updates 26 Dr Amy Carpio arrived, assessed patient, will sign off on patient, stated she can probably go home today

## 2019-04-03 NOTE — PROGRESS NOTES
Patient delusional, hearing and seeing things. Dr Gonzales Loges to bedside. Per Dr Gonzales Loges, start patient on [address], Per MD just give 1mg ativan iv now (score warrants 2mg) will reassess.

## 2019-04-03 NOTE — PROGRESS NOTES
responded to Rapid Response for  Sugar Ortiz, who is a 37 y.o.,female, Patient checked and was fine, able to articulate who she was and where she was. Plan: 
Chaplains will continue to follow and will provide spiritual care as needed.  recommends bedside caregivers page  on duty if patient or family shows signs of acute spiritual or emotional distress. Sister Walt Mcdaniel, [place], [address]  Spiritual Care [phone]

## 2019-04-03 NOTE — PROGRESS NOTES
TRANSFER - IN REPORT: 
 
Verbal report received from [place] RN(name) on Arley Joseph  being received from ICU(unit) for routine progression of care Report consisted of patients Situation, Background, Assessment and  
Recommendations(SBAR). Information from the following report(s) SBAR, Kardex, ED Summary, Intake/Output, MAR and Accordion was reviewed with the receiving nurse. Opportunity for questions and clarification was provided. Assessment completed upon patients arrival to unit and care assumed. 12 Pt began to show increased confusion and began seeing and hearing things that weren't there. Disoriented to place and year. 1410 Pt began to become more delusional and walk to other patients' rooms. Pt oriented back to her room. Pt scored a CIWA of 21. Pt refused ativan . MD aware. Told to monitor her CIWA. 
 
1500 Pt moved to room 331 due to the fact we want to keep her safe. Shift Summary: Shift uneventful. No complaints of chest pain or shortness of breath. Call light is within reach.

## 2019-04-03 NOTE — PROGRESS NOTES
Cm met with pt at bedside after being transferred to tele unit to discuss d/c planning, pt sitting up in bed with lunch tray stated she can's move her hands and fingers to pick her sandwich up requested cm to bring her hand bag to her bedside to look for her pcp contact information once cm provided bag pt immediately started moving her hands and fingers looking thru her bag,cm explained role as d/c planner,pt stated prior to admission active and independent with care, has 5 children ages 14,5,5 and 11 yr old twins,pt denies having hx of seizure activities, Pcp is Dr.William Wilkins in [facility], states she has an 10 year relationship with his practice willing to drive the distance, pt does have hx of depression states she see's pcp for all her care including depression, patient aware that she may be seen by PT prior to her discharge to make sure she can safely ambulate without assistance, did provide home health list to review if needed,cm will cont to review case for d/c planning,pt resumed back to eating lunch in bed, aware to contact staff for assistance getting out of bed,agreed.

## 2019-04-03 NOTE — PROGRESS NOTES
Problem: Falls - Risk of 
Goal: *Absence of Falls Description Document Moses Carter Fall Risk and appropriate interventions in the flowsheet. Outcome: Progressing Towards Goal 
  
Problem: Patient Education: Go to Patient Education Activity Goal: Patient/Family Education Outcome: Progressing Towards Goal 
  
Problem: Pressure Injury - Risk of 
Goal: *Prevention of pressure injury Description Document López Scale and appropriate interventions in the flowsheet. Outcome: Progressing Towards Goal 
  
Problem: Patient Education: Go to Patient Education Activity Goal: Patient/Family Education Outcome: Progressing Towards Goal 
  
Problem: Non-Violent Restraints Goal: *Removal from restraints as soon as assessed to be safe Outcome: Progressing Towards Goal 
Goal: *No harm/injury to patient while restraints in use Outcome: Progressing Towards Goal 
Goal: *Patient's dignity will be maintained Outcome: Progressing Towards Goal 
Goal: *Patient Specific Goal (EDIT GOAL, INSERT TEXT) Outcome: Progressing Towards Goal 
Goal: Non-violent Restaints:Standard Interventions Outcome: Progressing Towards Goal 
Goal: Non-violent Restraints:Patient Interventions Outcome: Progressing Towards Goal 
Goal: Patient/Family Education Outcome: Progressing Towards Goal

## 2019-04-03 NOTE — PROGRESS NOTES
1930- Pt is upset that the nurses who gave report were laughing at her because she could not read a card that was behind her, which did not happen. Pt then became upset because the hospital is giving her records to the man that was in the room with her. After, patient began crying stating that she heard a man say her name and that this man was laughing and making fun of her. [address] a 10. Medicated with 1mg PO ativan. 1939- Pt would like to speak with the MD who gave the \"men\" her records. 65- RN called into patients room again. Pt stated that she wanted to see her records to see how much damage was done. Pt stated she saw and heard men laughing about her EKG and would like to see her neurologist in the morning. 1- Pt continues to accuse all staff of giving away her medical records to the men in suits that were in her room. Pt insisting on seeing all her medical records to see what damage and information has been stolen. Attempts to reorient pt and assure her that her information is safe have failed. 7336- Patient concerned because CNA had told pt \"that I will be performing a procedure on you\". Pt also accused staff of stealing her urine without permission. RN reassured patient that no procedures would be performed and that the CNA was merely dumping urine from hat into toilet to measure output. 0110- Pt continues to call RN into room stating that she can hear people talking outside her room about performing a procedure on her. Pt also stated she can see people hiding behind the door. Pt CIWA score a 10 and medicated with 1mg PO ativan. 0340- Pt escorted to the bathroom where she insisted there was smoke. No smoke seen by staff. 
 
5346- RN called into room. Pt is upset because she heard someone mention that they were going to decrease her urine output by using the computer.  RN reassured patient that urine output is regulated by patients body and cannot be touched using a computer. Pt stated she heard someone in the corner of he room discussing a procedure again. Shift Summary- Pt continues to show signs of auditory and visual hallucinations. Pt very anxious and wary of all staff members and everything that done, including passing medication. Pt medicated with Ativan prn during the night. Pt did not sleep during the night and stayed awake watching television. Bed alarm kept on as patient forgets to call for help when getting up to use the restroom. Pt is unsteady on feet. Pt is too paranoid with staff getting her information to complete admission database at this time. Patient Vitals for the past 12 hrs: 
 Temp Pulse Resp BP SpO2  
04/04/19 0401 98.2 °F (36.8 °C) 76 16 139/80 98 % 04/03/19 2333 98.2 °F (36.8 °C) 89 16 134/77 97 % 04/03/19 1951 99.6 °F (37.6 °C) (!) 110 16 153/82 99 %

## 2019-04-03 NOTE — PROGRESS NOTES
0815-TRANSFER - OUT REPORT: 
 
Verbal report given to [employer], RN(name) on Loy Posadas  being transferred to (unit) for routine progression of care Report consisted of patients Situation, Background, Assessment and  
Recommendations(SBAR). Information from the following report(s) SBAR, Kardex and MAR was reviewed with the receiving nurse. Lines:  
Saline Lock 04/02/19 Left Antecubital (Active) Site Assessment Clean, dry, & intact 4/3/2019  4:00 AM  
Phlebitis Assessment 0 4/3/2019  4:00 AM  
Infiltration Assessment 0 4/3/2019  4:00 AM  
Dressing Status Clean, dry, & intact 4/3/2019  4:00 AM  
Dressing Type Transparent 4/3/2019  4:00 AM  
Hub Color/Line Status Pink; Infusing 4/3/2019  4:00 AM  
Action Taken Open ports on tubing capped 4/3/2019  4:00 AM  
   
Saline Lock 04/02/19 Right Hand (Active) Site Assessment Clean, dry, & intact 4/3/2019 12:00 AM  
Phlebitis Assessment 0 4/3/2019 12:00 AM  
Infiltration Assessment 0 4/3/2019 12:00 AM  
Dressing Status Clean, dry, & intact 4/3/2019 12:00 AM  
Dressing Type Transparent 4/3/2019 12:00 AM  
Hub Color/Line Status Pink;Capped 4/3/2019 12:00 AM  
Action Taken Open ports on tubing capped 4/3/2019 12:00 AM  
  
 
Opportunity for questions and clarification was provided. Patient transported with: 
 Monitor

## 2019-04-04 VITALS
RESPIRATION RATE: 16 BRPM | OXYGEN SATURATION: 98 % | SYSTOLIC BLOOD PRESSURE: 132 MMHG | BODY MASS INDEX: 21.56 KG/M2 | HEART RATE: 82 BPM | WEIGHT: 121.69 LBS | DIASTOLIC BLOOD PRESSURE: 80 MMHG | TEMPERATURE: 98.1 F

## 2019-04-04 PROCEDURE — 97161 PT EVAL LOW COMPLEX 20 MIN: CPT

## 2019-04-04 PROCEDURE — 74011250636 HC RX REV CODE- 250/636: Performed by: HOSPITALIST

## 2019-04-04 PROCEDURE — 74011250637 HC RX REV CODE- 250/637: Performed by: HOSPITALIST

## 2019-04-04 PROCEDURE — 74011250637 HC RX REV CODE- 250/637: Performed by: PSYCHIATRY & NEUROLOGY

## 2019-04-04 RX ORDER — LITHIUM CARBONATE 300 MG/1
300 CAPSULE ORAL 3 TIMES DAILY
Status: DISCONTINUED | OUTPATIENT
Start: 2019-04-04 | End: 2019-04-04 | Stop reason: HOSPADM

## 2019-04-04 RX ORDER — DIVALPROEX SODIUM 500 MG/1
500 TABLET, EXTENDED RELEASE ORAL DAILY
Qty: 30 TAB | Refills: 0 | Status: SHIPPED | OUTPATIENT
Start: 2019-04-05 | End: 2020-08-02

## 2019-04-04 RX ORDER — FLUOXETINE 10 MG/1
10 CAPSULE ORAL DAILY
Status: DISCONTINUED | OUTPATIENT
Start: 2019-04-04 | End: 2019-04-04 | Stop reason: HOSPADM

## 2019-04-04 RX ORDER — DIVALPROEX SODIUM 500 MG/1
500 TABLET, EXTENDED RELEASE ORAL DAILY
Qty: 30 TAB | Refills: 0 | Status: SHIPPED | OUTPATIENT
Start: 2019-04-05 | End: 2019-04-04

## 2019-04-04 RX ADMIN — ENOXAPARIN SODIUM 40 MG: 40 INJECTION SUBCUTANEOUS at 04:58

## 2019-04-04 RX ADMIN — LORAZEPAM 1 MG: 1 TABLET ORAL at 01:09

## 2019-04-04 RX ADMIN — FLUOXETINE 10 MG: 10 CAPSULE ORAL at 08:54

## 2019-04-04 RX ADMIN — LITHIUM CARBONATE 300 MG: 300 CAPSULE, GELATIN COATED ORAL at 08:54

## 2019-04-04 RX ADMIN — DIVALPROEX SODIUM 500 MG: 500 TABLET, EXTENDED RELEASE ORAL at 08:54

## 2019-04-04 NOTE — CDMP QUERY
Pt admitted with seizure, Encephalopathy documented. Please further specify type of Encephalopathy in the medical record ? Alcoholic Encephalopathy ? Anoxic Encephalopathy 
? Hepatic Encephalopathy (please specify if with or without coma) ? Hypertensive Encephalopathy ? Metabolic Encephalopathy ? Septic Encephalopathy ? Toxic Encephalopathy 
? Encephalopathy due to medications or drugs (please specify) ? Toxic Metabolic Encephalopathy ? Wernickes Encephalopathy 
? Other Encephalopathy 
? Other, please specify ? Clinically unable to determine The medical record reflects the following: 
   Risk Factors: positive benzodiazepines, Clinical Indicators: Documented  \" encephalopathy yesterday suspect withdrawal from benzodiazepines\". Treatment: banana bag, withdrawal protocol, Thank-You Juanito Park RN [address] [place] [address] [phone]

## 2019-04-04 NOTE — PROGRESS NOTES
Problem: Mobility Impaired (Adult and Pediatric) Goal: *Acute Goals and Plan of Care (Insert Text) Description PT evaluation completed and no goals necessary at this time as pt demonstrates functional mobility with independence. Outcome: Resolved/Met PHYSICAL THERAPY EVALUATION & DISCHARGE Patient: Mick Sweetain (46 y.o. female) Date: 4/4/2019 Primary Diagnosis: New onset seizure ([address] [R56.9] Drug withdrawal ([address] [M01.843] Precautions:Seizure ASSESSMENT AND RECOMMENDATIONS: 
Based on the objective data described below, the patient is a 37 F seen on telemetry unit. Pt presents with ROM/motor performance WFL and functional mobility performance at independent level, including ambulation >300ft with GB, no AD and normal gt pattern. Also demonstrates step negotiation with R HR using box step stimulation x 12 reps. Pt returned Skilled physical therapy is not indicated at this time. Discharge Recommendations: None Further Equipment Recommendations for Discharge: N/A   
 
SUBJECTIVE:  
Patient stated ? I'm doing okay. ? OBJECTIVE DATA SUMMARY:  
 
Past Medical History:  
Diagnosis Date Kidney stones No past surgical history on file. Barriers to Learning/Limitations: None Compensate with: visual, verbal, tactile, kinesthetic cues/model Prior Level of Function/Home Situation: independent Home Situation Home Environment: Private residence # Steps to Enter: 2 Rails to Enter: No 
One/Two Story Residence: Two story # of Interior Steps: 12 Interior Rails: Right Lift Chair Available: No 
Living Alone: No 
Support Systems: Spouse/Significant Other/Partner Patient Expects to be Discharged to[de-identified] Private residence Current DME Used/Available at Home: None Tub or Shower Type: Tub/Shower combination Critical Behavior: 
Neurologic State: Alert; Appropriate for age Orientation Level: Oriented X4 Cognition: Follows commands Safety/Judgement: Awareness of environment; Fall prevention Psychosocial 
Patient Behaviors: Calm; Cooperative Purposeful Interaction: Yes Pt Identified Daily Priority: Clinical issues (comment) Caritas Process: Nurture loving kindness;Enable kathy/hope;Establish trust;Teaching/learning Caring Interventions: Reassure; Therapeutic modalities Reassure: Therapeutic listening;Caring rounds; Informing Therapeutic Modalities: Humor; Intentional therapeutic touch Skin Condition/Temp: Warm;Dry Skin Integrity: Intact Skin Integumentary Skin Color: Appropriate for ethnicity Skin Condition/Temp: Warm;Dry Skin Integrity: Intact Turgor: Non-tenting Hair Growth: Present Varicosities: Absent Strength:   
Strength: Within functional limits Tone & Sensation:  
Sensation: Intact Range Of Motion: 
AROM: Within functional limits Functional Mobility: 
Bed Mobility: 
Rolling: Independent Supine to Sit: Independent Sit to Supine: Independent Scooting: Independent Transfers: 
Sit to Stand: Independent Stand to Sit: Independent Balance:  
Sitting: Intact Standing: Intact Ambulation/Gait Training: 
Distance (ft): 400 Feet (ft) Assistive Device: Gait belt Ambulation - Level of Assistance: Independent Gait Description (WDL): Exceptions to [facility] Stairs: 
Number of Stairs Trained: 12 
Stairs - Level of Assistance: Modified independent Rail Use: Right Pain: 
Pain Scale 1: Numeric (0 - 10) Pain Intensity 1: 0 Activity Tolerance:  
Good Please refer to the flowsheet for vital signs taken during this treatment. After treatment:  
?         Patient left in no apparent distress sitting up in chair ? Patient left in no apparent distress in bed 
? Call bell left within reach ? Nursing notified ? Caregiver present ? Bed alarm activated COMMUNICATION/EDUCATION:  
?         Fall prevention education was provided and the patient/caregiver indicated understanding. ?          Patient/family have participated as able in goal setting and plan of care. ?         Patient/family agree to work toward stated goals and plan of care. ?         Patient understands intent and goals of therapy, but is neutral about his/her participation. ? Patient is unable to participate in goal setting and plan of care. Eval Complexity: History: MEDIUM  Complexity : 1-2 comorbidities / personal factors will impact the outcome/ POC Exam:LOW Complexity : 1-2 Standardized tests and measures addressing body structure, function, activity limitation and / or participation in recreation  Presentation: LOW Complexity : Stable, uncomplicated  Clinical Decision Making:Low Complexity    Overall Complexity:LOW Thank you for this referral. 
Darcy Sheridan, PT Time Calculation: 14 mins

## 2019-04-04 NOTE — PROGRESS NOTES
Transition of care:d/c home Met with patent and her  and Dr. Michael Christie at bedside. Patient request to be d/c home.  states he will be there with her 24 hours day. Patient states she will follow up with Dr. Suellen Monk states she will  Make her appointments. No needs from cm. Patient  states he had brought her medications in. [facility], [address]  Aware. security called per patients request to bring her wedding band.  will drive her rhome. Patient has tricre for insurance. Dr. Michael Christie will send rx for depakote to [facility]. Care Management Interventions Transition of Care Consult (CM Consult): Discharge Planning Current Support Network: Lives with Spouse Confirm Follow Up Transport: Family Plan discussed with Pt/Family/Caregiver: Yes Freedom of Choice Offered: Yes Discharge Location Discharge Placement: Home with family assistance

## 2019-04-04 NOTE — PROGRESS NOTES
Hospitalist Progress Note Patient: Aminta Welch MRN: [id number]  CSN: [id number] YOB: 1975  Age: 37 y.o. Sex: female DOA: 4/2/2019 LOS:  LOS: 2 days Chief Complaint: 
 
seizures Assessment/Plan New onset seizures Abnormal EEG Psychiatric disorder-depression, anxiety-on lithium, prozac, seroquel at home, as well as valium Encephalopathy yesterday afternoon, suspect withdrawal from benzodiazepines Have spoken with her PCP and her  this am 
See d/c summary dictated Plan d/c today Disposition : 
Patient Active Problem List  
Diagnosis Code  Status epilepticus ([facility] G40.901  Polypharmacy Z79.899  Drug withdrawal ([facility] [id number]  New onset seizure ([facility] R56.9 Subjective: 
I feel good, can I go home! Walked with PT Denies HA, nausea  and children at bedside Review of systems: 
 
Constitutional: denies fevers, chill Respiratory: denies SOB Cardiovascular: denies chest pain, palpitations Gastrointestinal: denies nausea, vomiting Vital signs/Intake and Output: 
Visit Vitals /80 (BP 1 Location: Right arm, BP Patient Position: At rest) Pulse 82 Temp 98.1 °F (36.7 °C) Resp 16 Wt 55.2 kg (121 lb 11.1 oz) SpO2 98% BMI 21.56 kg/m² Current Shift:  No intake/output data recorded. Last three shifts:  04/02 1901 - 04/04 0700 In: 2277.9 [P.O.:480; I.V.:1797.9] Out: 1800 [Urine:1800] Exam: 
 
General: Well developed, alert, NAD, OX3 Head/Neck: NCAT, supple, No masses, No lymphadenopathy CVS:Regular rate and rhythm, no M/R/G, S1/S2 heard, no thrill Lungs:Clear to auscultation bilaterally, no wheezes, rhonchi, or rales Abdomen: Soft, Nontender, No distention, Normal Bowel sounds, No hepatomegaly Extremities: No C/C/E, pulses palpable 2+ Skin:normal texture and turgor, no rashes, no lesions Neuro:grossly normal , follows commands Psych:appropriate Labs: Results: Chemistry Recent Labs 04/02/19 0040 *   
K 4.2 * CO2 20* BUN 11  
CREA 1.01  
CA 8.9 AGAP 10 BUCR 11* AP 99  
TP 7.1 ALB 4.0  
GLOB 3.1 AGRAT 1.3  
  
CBC w/Diff Recent Labs 04/02/19 0040 WBC 15.8*  
RBC 4.58 HGB 14.1 HCT 42.5 * GRANS 82* LYMPH 13* EOS 0 Cardiac Enzymes Recent Labs 04/02/19 0040 CPK 57 CKND1 CALCULATION NOT PERFORMED WHEN RESULT IS BELOW LINEAR LIMIT Coagulation No results for input(s): PTP, INR, APTT in the last 72 hours. No lab exists for component: INREXT Lipid Panel No results found for: CHOL, CHOLPOCT, CHOLX, CHLST, CHOLV, 922327, HDL, LDL, LDLC, DLDLP, 166300, VLDLC, VLDL, TGLX, TRIGL, TRIGP, TGLPOCT, CHHD, CHHDX  
BNP No results for input(s): BNPP in the last 72 hours. Liver Enzymes Recent Labs 04/02/19 0040 TP 7.1 ALB 4.0 AP 99 SGOT 18 Thyroid Studies Lab Results Component Value Date/Time TSH 1.90 04/02/2019 12:40 AM  
    
Procedures/imaging: see electronic medical records for all procedures/Xrays and details which were not copied into this note but were reviewed prior to creation of Plan Tavares Webb MD

## 2019-04-04 NOTE — DISCHARGE INSTRUCTIONS
Patient Education     Seizure: Care Instructions  Your Care Instructions    Seizures are caused by abnormal patterns of electrical signals in the brain. They are different for each person. Seizures can affect movement, speech, vision, or awareness. Some people have only slight shaking of a hand and do not pass out. Other people may pass out and have violent shaking of the whole body. Some people appear to stare into space. They are awake, but they can't respond normally. Later, they may not remember what happened. You may need tests to identify the type and cause of the seizures. A seizure may occur only once, or you may have them more than one time. Taking medicines as directed and following up with your doctor may help keep you from having more seizures. The doctor has checked you carefully, but problems can develop later. If you notice any problems or new symptoms, get medical treatment right away. Follow-up care is a key part of your treatment and safety. Be sure to make and go to all appointments, and call your doctor if you are having problems. It's also a good idea to know your test results and keep a list of the medicines you take. How can you care for yourself at home? · Be safe with medicines. Take your medicines exactly as prescribed. Call your doctor if you think you are having a problem with your medicine. · Do not do any activity that could be dangerous to you or others until your doctor says it is safe to do so. For example, do not drive a car, operate machinery, swim, or climb ladders. · Be sure that anyone treating you for any health problem knows that you have had a seizure and what medicines you are taking for it. · Identify and avoid things that may make you more likely to have a seizure. These may include lack of sleep, alcohol or drug use, stress, or not eating. · Make sure you go to your follow-up appointment. When should you call for help?   Call 911 anytime you think you may need emergency care. For example, call if:    · You have another seizure.     · You have more than one seizure in 24 hours.     · You have new symptoms, such as trouble walking, speaking, or thinking clearly.    Call your doctor now or seek immediate medical care if:    · You are not acting normally.    Watch closely for changes in your health, and be sure to contact your doctor if you have any problems. Where can you learn more? Go to http://olivia-tutu.info/. Enter B683 in the search box to learn more about \"Seizure: Care Instructions. \"  Current as of: Marisol 3, 2018  Content Version: 11.9  © 4400-0616 [employer]. Care instructions adapted under license by [employer] (which disclaims liability or warranty for this information). If you have questions about a medical condition or this instruction, always ask your healthcare professional. [address] any warranty or liability for your use of this information. Misuse of Multiple Drugs: Care Instructions  Your Care Instructions    You have had treatment to help your body get rid of a combination of any of these drugs:  · Prescription medicines  · Over-the-counter medicines  · Alcohol  · Illegal drugs  Taking some drugs together may cause a bad reaction. They can have unexpected or stronger effects on your body and mind. For example, benzodiazepines (such as alprazolam and lorazepam) and alcohol both depress the nervous system. Taken together, each one is stronger than when it is taken by itself. You are getting better, but it takes time for the drugs to leave your body. It may take up to 2 weeks for your mind to clear and your mood to improve. Depending on the drugs you took, the doctor might have:  · Watched your symptoms or done tests to find out what drugs were in your body. · Treated you to control your breathing, blood pressure, and heart rate.   · Tried to remove the drugs from your body by pumping your stomach or giving you a substance by mouth that absorbs chemicals. · Given you a substance that neutralizes chemicals (antidote). · Given you oxygen to help you breathe. · Given you fluids. The doctor also watched you carefully to make sure you were recovering safely. Follow-up care is a key part of your treatment and safety. Be sure to make and go to all appointments, and call your doctor if you are having problems. It's also a good idea to know your test results and keep a list of the medicines you take. How can you care for yourself at home? · When you take substances like alcohol and some drugs regularly, your body gets used to them. This is called dependency. If you are dependent on them, you may have withdrawal symptoms when you stop taking them. These symptoms may include trembling, feeling restless, and sweating. To help get past these:  ? Get plenty of rest.  ? Drink lots of fluids. ? Stay active. ? Eat a healthy diet. · If you had a tube in your throat to help you breathe, you may have a sore throat or hoarseness that can last a few days. Drinking fluids may help soothe your throat. Get help to stop using drugs. Talk to your doctor about drug and alcohol treatment programs. When should you call for help? Call 911 anytime you think you may need emergency care. For example, call if:    · You feel you cannot stop from hurting yourself or someone else.   [facility] your doctor now or seek immediate medical care if:    · You have new or worse symptoms of withdrawal, such as trembling, feeling restless, and sweating, that you can't manage at home.    Watch closely for changes in your health, and be sure to contact your doctor if:    · You do not get better as expected.     · You need help finding the right place to get help with drug or alcohol problems. Where can you learn more? Go to http://olivia-tutu.info/.   Enter B109 in the search box to learn more about \"Misuse of Multiple Drugs: Care Instructions. \"  Current as of: May 7, 2018  Content Version: 11.9  © 3403-7937 [employer]. Care instructions adapted under license by [employer] (which disclaims liability or warranty for this information). If you have questions about a medical condition or this instruction, always ask your healthcare professional. [place] [address] any warranty or liability for your use of this information.

## 2019-04-04 NOTE — DISCHARGE SUMMARY
[address]    Name:  Michelle León  MR#:   [id number]  :  1975  ACCOUNT #:  [de-identified]  ADMIT DATE:  2019  DISCHARGE DATE:  2019    DISCHARGE DIAGNOSES:  1.  New onset seizures. 2.  Probable benzodiazepine withdrawal.  3.  Delusions and hallucinations, improved. 4.  Chronic depression and anxiety. HOSPITAL SUMMARY:  This is a 49-year-old  female brought to the hospital due to mental status changes and a seizure at home. Her  found her confused, complaining of headache and dizziness. He says she collapsed and had a seizure. EMS was called, they brought her into the emergency room where she again had another seizure and then subsequently had another seizure when she was taken to CT for brain scan. She was treated with Ativan for recurrent seizure and admitted to the ICU. Her  notes that her medication bottles seemed to have more medications taken than were supposed to have been for the time period she has had them. She had Flexeril at home, Valium, Xanax, lithium, Prozac. We confirmed that she is treated for depression by her primary care doctor in [place], [place] with lithium and Prozac and she had been changing from Xanax to Valium, so she did have both at home. She had also taken Seroquel because she did not have Ambien for insomnia and she had taken up to three Seroquel on the day that she had a seizure in order to try and go to sleep. No suicidal or homicidal ideations has been noted. She has had no psychiatric hospitalizations recently or in the near past.  Her PCP confirmed that also. Here she was able to wake up. Has had no further seizures. She did have an abnormal EEG on , this EEG showed moderate generalized slowing which is an indicator diffuse cerebral dysfunction from any cause including but limited to toxic metabolic and infectious etiologies.   Recommendation from neurology was to continue anticonvulsant medication and to be on Depakote once a day. Initially, she had been treated with IV Keppra. Neurology recommended that she should not go back on Seroquel due to the lowering of seizure threshold with that and also to address the benzodiazepine use. We certainly have concern about her withdrawing from that here. Yesterday, she was a hallucinatory and was having occasional delusions that seemed to get better throughout the course of today where she has been up and ambulated with PT. She is more in her right state of mind at this point. Her  feels like she is markedly better and he wants to take her home today. I spoke to her primary care physician to give him an update on the concerns here. He is going to meet with her next week in the office and she will confirm the appointment time today. With that, she should not be on opioids or muscle relaxants either. She should continue on her lithium as prescribed and address that with her PCP as well as her Prozac. Otherwise, she is to avoid any further benzodiazepines like Xanax or Valium and to not take the codeine or hydrocodone that were on her list.  She is not to take Seroquel any longer and she can continue with the Depakote 500 mg daily that was prescribed here. Her lithium 300 mg three times daily and Prozac 10 mg daily. She can take Tylenol every 6 hours as needed for pain or headache. Regular diet is recommended. Here, she had a CT scan of her brain that showed no acute issue. She has had blood work to include a CBC, which was unremarkable. Chemistry panel and LFTs are okay. Her B12 was within normal limits. Cardiac markers are also acceptable. Hemoglobin A1c 4.8. Ammonia level less than 10. Pregnancy test negative. Chest x-ray showed no active pulmonary disease. EKG normal sinus rhythm. No acute ST-T wave changes. The patient was transferred to telemetry yesterday.   Overnight, she had some delusion about somebody writing things about her per the nurses report, but this morning she has been mentating appropriately, oriented x3, up and ambulatory. Appropriate with her family who is in the room currently including small children and her  feels like she is at her baseline to be able to discharge home. She denies any suicidal or homicidal ideation. She has close followup in place and her  will be in charge of looking over her and her medications. At this point, she is stable to discharge from the hospital 45 minutes on discharge time today.       Mitzy Mckenna MD      [id number]  D:  04/04/2019 11:35  T:  04/04/2019 11:38  JOB #:  [id number]  CC:  Brianna Joel MD

## 2019-04-04 NOTE — ROUTINE PROCESS
Bedside shift change report given to Shahnaz Roberson  (oncoming nurse) by Kiki Strickland RN  (offgoing nurse). Report included the following information SBAR, Kardex, MAR, Recent Results, Cardiac Rhythm NSR and Alarm Parameters .

## 2019-04-04 NOTE — PROGRESS NOTES
Discharge instructions reviewed with the patient. Patient verbalized understanding. All questions answered. IV discontinued, no redness, swelling or pain noted. Patient verbalized understanding and verified by teach back. Patient awaiting  at bedside for transportation home. Patient armband removed and shredded.

## 2019-04-05 NOTE — PROGRESS NOTES
Hospitalist Progress Note Patient: Rhonda Kaur MRN: [id number]  CSN: [id number] YOB: 1975  Age: 37 y.o. Sex: female DOA: 4/2/2019 LOS:  LOS: 2 days Toxic metabolic encephalopathy POA-CDMP query response Cat Gloria MD

## 2019-05-12 LAB
ATRIAL RATE: 68 BPM
ATRIAL RATE: 98 BPM
CALCULATED P AXIS, ECG09: 28 DEGREES
CALCULATED P AXIS, ECG09: 46 DEGREES
CALCULATED R AXIS, ECG10: 20 DEGREES
CALCULATED R AXIS, ECG10: 21 DEGREES
CALCULATED T AXIS, ECG11: 32 DEGREES
CALCULATED T AXIS, ECG11: 37 DEGREES
DIAGNOSIS, 93000: NORMAL
DIAGNOSIS, 93000: NORMAL
P-R INTERVAL, ECG05: 136 MS
P-R INTERVAL, ECG05: 146 MS
Q-T INTERVAL, ECG07: 342 MS
Q-T INTERVAL, ECG07: 412 MS
QRS DURATION, ECG06: 100 MS
QRS DURATION, ECG06: 92 MS
QTC CALCULATION (BEZET), ECG08: 436 MS
QTC CALCULATION (BEZET), ECG08: 438 MS
VENTRICULAR RATE, ECG03: 68 BPM
VENTRICULAR RATE, ECG03: 98 BPM

## 2020-08-02 ENCOUNTER — HOSPITAL ENCOUNTER (EMERGENCY)
Age: 45
Discharge: HOME | End: 2020-08-03
Attending: EMERGENCY MEDICINE
Payer: OTHER GOVERNMENT

## 2020-08-02 ENCOUNTER — APPOINTMENT (OUTPATIENT)
Dept: CT IMAGING | Age: 45
End: 2020-08-02
Attending: EMERGENCY MEDICINE
Payer: OTHER GOVERNMENT

## 2020-08-02 DIAGNOSIS — G89.18 POST-OP PAIN: Primary | ICD-10-CM

## 2020-08-02 DIAGNOSIS — Z90.49 S/P LAPAROSCOPIC CHOLECYSTECTOMY: ICD-10-CM

## 2020-08-02 DIAGNOSIS — L76.82 INCISIONAL PAIN: ICD-10-CM

## 2020-08-02 LAB
ALBUMIN SERPL-MCNC: 3 G/DL (ref 3.4–5)
ALBUMIN/GLOB SERPL: 1.1 {RATIO} (ref 0.8–1.7)
ALP SERPL-CCNC: 83 U/L (ref 45–117)
ALT SERPL-CCNC: 11 U/L (ref 13–56)
ANION GAP SERPL CALC-SCNC: 6 MMOL/L (ref 3–18)
APPEARANCE UR: CLEAR
AST SERPL-CCNC: 13 U/L (ref 10–38)
BACTERIA URNS QL MICRO: NEGATIVE /HPF
BASOPHILS # BLD: 0 K/UL (ref 0–0.1)
BASOPHILS NFR BLD: 0 % (ref 0–2)
BILIRUB SERPL-MCNC: 0.2 MG/DL (ref 0.2–1)
BILIRUB UR QL: NEGATIVE
BUN SERPL-MCNC: 7 MG/DL (ref 7–18)
BUN/CREAT SERPL: 9 (ref 12–20)
CALCIUM SERPL-MCNC: 8.1 MG/DL (ref 8.5–10.1)
CHLORIDE SERPL-SCNC: 111 MMOL/L (ref 100–111)
CK MB CFR SERPL CALC: NORMAL % (ref 0–4)
CK MB SERPL-MCNC: <1 NG/ML (ref 5–25)
CK SERPL-CCNC: 36 U/L (ref 26–192)
CO2 SERPL-SCNC: 24 MMOL/L (ref 21–32)
COLOR UR: YELLOW
CREAT SERPL-MCNC: 0.81 MG/DL (ref 0.6–1.3)
DIFFERENTIAL METHOD BLD: ABNORMAL
EOSINOPHIL # BLD: 0.1 K/UL (ref 0–0.4)
EOSINOPHIL NFR BLD: 2 % (ref 0–5)
EPITH CASTS URNS QL MICRO: NORMAL /LPF (ref 0–5)
ERYTHROCYTE [DISTWIDTH] IN BLOOD BY AUTOMATED COUNT: 13.5 % (ref 11.6–14.5)
GLOBULIN SER CALC-MCNC: 2.8 G/DL (ref 2–4)
GLUCOSE SERPL-MCNC: 91 MG/DL (ref 74–99)
GLUCOSE UR STRIP.AUTO-MCNC: NEGATIVE MG/DL
HCG UR QL: NEGATIVE
HCT VFR BLD AUTO: 38.3 % (ref 35–45)
HGB BLD-MCNC: 12.2 G/DL (ref 12–16)
HGB UR QL STRIP: ABNORMAL
KETONES UR QL STRIP.AUTO: NEGATIVE MG/DL
LEUKOCYTE ESTERASE UR QL STRIP.AUTO: NEGATIVE
LIPASE SERPL-CCNC: 54 U/L (ref 73–393)
LYMPHOCYTES # BLD: 2.1 K/UL (ref 0.9–3.6)
LYMPHOCYTES NFR BLD: 33 % (ref 21–52)
MCH RBC QN AUTO: 29.9 PG (ref 24–34)
MCHC RBC AUTO-ENTMCNC: 31.9 G/DL (ref 31–37)
MCV RBC AUTO: 93.9 FL (ref 74–97)
MONOCYTES # BLD: 0.3 K/UL (ref 0.05–1.2)
MONOCYTES NFR BLD: 5 % (ref 3–10)
NEUTS SEG # BLD: 3.8 K/UL (ref 1.8–8)
NEUTS SEG NFR BLD: 60 % (ref 40–73)
NITRITE UR QL STRIP.AUTO: NEGATIVE
PH UR STRIP: 6 [PH] (ref 5–8)
PLATELET # BLD AUTO: 298 K/UL (ref 135–420)
PMV BLD AUTO: 9.4 FL (ref 9.2–11.8)
POTASSIUM SERPL-SCNC: 4.4 MMOL/L (ref 3.5–5.5)
PROT SERPL-MCNC: 5.8 G/DL (ref 6.4–8.2)
PROT UR STRIP-MCNC: NEGATIVE MG/DL
RBC # BLD AUTO: 4.08 M/UL (ref 4.2–5.3)
RBC #/AREA URNS HPF: NORMAL /HPF (ref 0–5)
SODIUM SERPL-SCNC: 141 MMOL/L (ref 136–145)
SP GR UR REFRACTOMETRY: 1.01 (ref 1–1.03)
TROPONIN I SERPL-MCNC: <0.02 NG/ML (ref 0–0.04)
UROBILINOGEN UR QL STRIP.AUTO: 0.2 EU/DL (ref 0.2–1)
WBC # BLD AUTO: 6.4 K/UL (ref 4.6–13.2)
WBC URNS QL MICRO: NEGATIVE /HPF (ref 0–5)

## 2020-08-02 PROCEDURE — 93005 ELECTROCARDIOGRAM TRACING: CPT

## 2020-08-02 PROCEDURE — 81025 URINE PREGNANCY TEST: CPT

## 2020-08-02 PROCEDURE — 80053 COMPREHEN METABOLIC PANEL: CPT

## 2020-08-02 PROCEDURE — 82550 ASSAY OF CK (CPK): CPT

## 2020-08-02 PROCEDURE — 83690 ASSAY OF LIPASE: CPT

## 2020-08-02 PROCEDURE — 81001 URINALYSIS AUTO W/SCOPE: CPT

## 2020-08-02 PROCEDURE — 99285 EMERGENCY DEPT VISIT HI MDM: CPT

## 2020-08-02 PROCEDURE — 85025 COMPLETE CBC W/AUTO DIFF WBC: CPT

## 2020-08-02 PROCEDURE — 74176 CT ABD & PELVIS W/O CONTRAST: CPT

## 2020-08-02 PROCEDURE — 96372 THER/PROPH/DIAG INJ SC/IM: CPT

## 2020-08-02 PROCEDURE — 74011250637 HC RX REV CODE- 250/637: Performed by: EMERGENCY MEDICINE

## 2020-08-02 PROCEDURE — 74011250636 HC RX REV CODE- 250/636: Performed by: EMERGENCY MEDICINE

## 2020-08-02 RX ORDER — ACETAMINOPHEN 500 MG
1000 TABLET ORAL
COMMUNITY
Start: 2020-07-22

## 2020-08-02 RX ORDER — MORPHINE SULFATE 10 MG/ML
6 INJECTION, SOLUTION INTRAMUSCULAR; INTRAVENOUS ONCE
Status: COMPLETED | OUTPATIENT
Start: 2020-08-02 | End: 2020-08-02

## 2020-08-02 RX ORDER — ONDANSETRON 4 MG/1
4 TABLET, ORALLY DISINTEGRATING ORAL
Status: COMPLETED | OUTPATIENT
Start: 2020-08-02 | End: 2020-08-02

## 2020-08-02 RX ORDER — MORPHINE SULFATE 10 MG/ML
6 INJECTION, SOLUTION INTRAMUSCULAR; INTRAVENOUS
Status: DISCONTINUED | OUTPATIENT
Start: 2020-08-02 | End: 2020-08-02

## 2020-08-02 RX ADMIN — ONDANSETRON 4 MG: 4 TABLET, ORALLY DISINTEGRATING ORAL at 22:11

## 2020-08-02 RX ADMIN — MORPHINE SULFATE 6 MG: 10 INJECTION INTRAVENOUS at 22:11

## 2020-08-02 NOTE — ED TRIAGE NOTES
Patient states had gallbladder removed 10 days ago Dr. Estela Leonardo at [facility]. Patient states having pain around one of her incision sites.

## 2020-08-03 VITALS
HEART RATE: 98 BPM | WEIGHT: 170 LBS | BODY MASS INDEX: 31.28 KG/M2 | OXYGEN SATURATION: 100 % | SYSTOLIC BLOOD PRESSURE: 134 MMHG | HEIGHT: 62 IN | DIASTOLIC BLOOD PRESSURE: 83 MMHG | RESPIRATION RATE: 16 BRPM | TEMPERATURE: 97.7 F

## 2020-08-03 LAB
ATRIAL RATE: 87 BPM
CALCULATED P AXIS, ECG09: 56 DEGREES
CALCULATED R AXIS, ECG10: 42 DEGREES
CALCULATED T AXIS, ECG11: 53 DEGREES
DIAGNOSIS, 93000: NORMAL
P-R INTERVAL, ECG05: 146 MS
Q-T INTERVAL, ECG07: 362 MS
QRS DURATION, ECG06: 84 MS
QTC CALCULATION (BEZET), ECG08: 435 MS
VENTRICULAR RATE, ECG03: 87 BPM

## 2020-08-03 PROCEDURE — 74011250637 HC RX REV CODE- 250/637: Performed by: EMERGENCY MEDICINE

## 2020-08-03 RX ORDER — OXYCODONE HYDROCHLORIDE 5 MG/1
5 TABLET ORAL
Status: DISCONTINUED | OUTPATIENT
Start: 2020-08-03 | End: 2020-08-03 | Stop reason: HOSPADM

## 2020-08-03 RX ORDER — CARISOPRODOL 350 MG/1
350 TABLET ORAL
Qty: 12 TAB | Refills: 0 | Status: SHIPPED | OUTPATIENT
Start: 2020-08-03

## 2020-08-03 RX ORDER — CLINDAMYCIN HYDROCHLORIDE 150 MG/1
300 CAPSULE ORAL
Status: COMPLETED | OUTPATIENT
Start: 2020-08-03 | End: 2020-08-03

## 2020-08-03 RX ORDER — CLINDAMYCIN HYDROCHLORIDE 300 MG/1
300 CAPSULE ORAL 4 TIMES DAILY
Qty: 40 CAP | Refills: 0 | Status: SHIPPED | OUTPATIENT
Start: 2020-08-03 | End: 2020-08-13

## 2020-08-03 RX ADMIN — CLINDAMYCIN HYDROCHLORIDE 300 MG: 150 CAPSULE ORAL at 00:45

## 2020-08-03 RX ADMIN — OXYCODONE 5 MG: 5 TABLET ORAL at 00:45

## 2020-08-03 NOTE — ED PROVIDER NOTES
EMERGENCY DEPARTMENT HISTORY AND PHYSICAL EXAM    Date: 2020  Patient Name: Axel Cortez    History of Presenting Illness     Chief Complaint   Patient presents with    Post OP Complication         History Provided By: Patient    Additional History (Context):   9:14 PM  Axel Cortez is a 39 y.o. female with PMHX of chronic Kidney Stones who presents to the emergency department C/O post-op complication. Associated sxs include low-grade fever, nausea, vomiting, and mild cough. The pt reports she had a cholecystectomy on  due to significant gall stones. States persistent issue with an incision site s/p surgery. The patient was previously taking Dilaudid, Oxycodone, and Extra Strength Tylenol with no improvement. States her PCP, Dr. Haylie Tony MD has been following her closely. Denies urinary discomfort, diaphoresis. Social History:  Denies EtOH, cigarette, and illicit drug use. Family History:  No significant hx    Surgical History:  Cholecystectomy, Appendectomy, , and urological stents    PCP: Sandra, MD Martir        Past History     Past Medical History:  Past Medical History:   Diagnosis Date    Kidney stones        Past Surgical History:  Past Surgical History:   Procedure Laterality Date    HX APPENDECTOMY      HX CHOLECYSTECTOMY      HX GYN      hysterectomy, breast reconstruction, 4 .  HX UROLOGICAL      Stents. Family History:  History reviewed. No pertinent family history. Social History:  Social History     Tobacco Use    Smoking status: Passive Smoke Exposure - Never Smoker    Smokeless tobacco: Never Used   Substance Use Topics    Alcohol use: No     Frequency: Never    Drug use: Never       Allergies:   Allergies   Allergen Reactions    Motrin [Ibuprofen] Other (comments)     Gastric Bypass    Nsaids (Non-Steroidal Anti-Inflammatory Drug) Other (comments)     Gastric Bypass    Prednisone Other (comments)     Gastric Bypass Review of Systems   Review of Systems   Constitutional: Positive for fever (low grade (97.7 F in the ED)). Negative for chills, diaphoresis and unexpected weight change. HENT: Negative for congestion, drooling, ear pain, rhinorrhea, sore throat, tinnitus and trouble swallowing. Eyes: Negative for photophobia, pain, redness and visual disturbance. Respiratory: Positive for cough (mild). Negative for choking, chest tightness, shortness of breath, wheezing and stridor. Cardiovascular: Negative for chest pain, palpitations and leg swelling. Gastrointestinal: Positive for nausea and vomiting. Negative for abdominal distention, abdominal pain, anal bleeding, blood in stool, constipation and diarrhea. Genitourinary: Negative for difficulty urinating, dysuria, flank pain, frequency, hematuria and urgency. Musculoskeletal: Negative for arthralgias, back pain and neck pain. Skin: Negative for color change, rash and wound. Neurological: Negative for dizziness, seizures, syncope, speech difficulty, light-headedness and headaches. Hematological: Does not bruise/bleed easily. Psychiatric/Behavioral: Negative for agitation, behavioral problems, hallucinations, self-injury and suicidal ideas. The patient is not hyperactive. Physical Exam     Vitals:    08/02/20 1946 08/02/20 2300   BP: 112/68 133/78   Pulse: 98    Resp: 16    Temp: 97.7 °F (36.5 °C)    SpO2: 100% 100%   Weight: 77.1 kg (170 lb)    Height: 5' 2\" (1.575 m)      Physical Exam  Vitals signs and nursing note reviewed. Constitutional:       General: She is not in acute distress. Appearance: She is well-developed. She is not toxic-appearing or diaphoretic. Comments: Uncomfortable appearing   HENT:      Head: Normocephalic and atraumatic. Right Ear: External ear normal.      Left Ear: External ear normal.      Mouth/Throat:      Pharynx: No oropharyngeal exudate. Eyes:      General: No scleral icterus. Conjunctiva/sclera: Conjunctivae normal.      Pupils: Pupils are equal, round, and reactive to light. Comments: No pallor   Neck:      Musculoskeletal: Normal range of motion and neck supple. Thyroid: No thyromegaly. Vascular: No JVD. Trachea: No tracheal deviation. Cardiovascular:      Rate and Rhythm: Normal rate and regular rhythm. Heart sounds: Normal heart sounds. Pulmonary:      Effort: Pulmonary effort is normal. No respiratory distress. Breath sounds: Normal breath sounds. No stridor. Abdominal:      General: Bowel sounds are normal. There is no distension. Palpations: Abdomen is soft. Tenderness: There is no abdominal tenderness. There is no guarding or rebound. Musculoskeletal: Normal range of motion. General: No tenderness. Comments: No soft tissue injuries   Lymphadenopathy:      Cervical: No cervical adenopathy. Skin:     General: Skin is warm and dry. Findings: No erythema or rash. Comments: multiple incisions on the abdomen with the most exquisitely tender to the LUQ which is 3 cm long w/o much erythema, ecchymosis or underlying fluctuance   Neurological:      Mental Status: She is alert and oriented to person, place, and time. Cranial Nerves: No cranial nerve deficit. Coordination: Coordination normal.   Psychiatric:         Behavior: Behavior normal.         Thought Content:  Thought content normal.         Judgment: Judgment normal.           Diagnostic Study Results     Labs -     Recent Results (from the past 12 hour(s))   EKG, 12 LEAD, INITIAL    Collection Time: 08/02/20  8:02 PM   Result Value Ref Range    Ventricular Rate 87 BPM    Atrial Rate 87 BPM    P-R Interval 146 ms    QRS Duration 84 ms    Q-T Interval 362 ms    QTC Calculation (Bezet) 435 ms    Calculated P Axis 56 degrees    Calculated R Axis 42 degrees    Calculated T Axis 53 degrees    Diagnosis       Normal sinus rhythm  Normal ECG  When compared with ECG of 03-APR-2019 13:06,  Nonspecific T wave abnormality no longer evident in Inferior leads  Nonspecific T wave abnormality no longer evident in Lateral leads     URINALYSIS W/ RFLX MICROSCOPIC    Collection Time: 08/02/20  9:00 PM   Result Value Ref Range    Color YELLOW      Appearance CLEAR      Specific gravity 1.011 1.005 - 1.030      pH (UA) 6.0 5.0 - 8.0      Protein Negative NEG mg/dL    Glucose Negative NEG mg/dL    Ketone Negative NEG mg/dL    Bilirubin Negative NEG      Blood TRACE (A) NEG      Urobilinogen 0.2 0.2 - 1.0 EU/dL    Nitrites Negative NEG      Leukocyte Esterase Negative NEG     URINE MICROSCOPIC ONLY    Collection Time: 08/02/20  9:00 PM   Result Value Ref Range    WBC Negative 0 - 5 /hpf    RBC 0 to 3 0 - 5 /hpf    Epithelial cells 1+ 0 - 5 /lpf    Bacteria Negative NEG /hpf   HCG URINE, QL. - POC    Collection Time: 08/02/20  9:22 PM   Result Value Ref Range    Pregnancy test,urine (POC) Negative NEG     CBC WITH AUTOMATED DIFF    Collection Time: 08/02/20 11:10 PM   Result Value Ref Range    WBC 6.4 4.6 - 13.2 K/uL    RBC 4.08 (L) 4.20 - 5.30 M/uL    HGB 12.2 12.0 - 16.0 g/dL    HCT 38.3 35.0 - 45.0 %    MCV 93.9 74.0 - 97.0 FL    MCH 29.9 24.0 - 34.0 PG    MCHC 31.9 31.0 - 37.0 g/dL    RDW 13.5 11.6 - 14.5 %    PLATELET 752 308 - 059 K/uL    MPV 9.4 9.2 - 11.8 FL    NEUTROPHILS 60 40 - 73 %    LYMPHOCYTES 33 21 - 52 %    MONOCYTES 5 3 - 10 %    EOSINOPHILS 2 0 - 5 %    BASOPHILS 0 0 - 2 %    ABS. NEUTROPHILS 3.8 1.8 - 8.0 K/UL    ABS. LYMPHOCYTES 2.1 0.9 - 3.6 K/UL    ABS. MONOCYTES 0.3 0.05 - 1.2 K/UL    ABS. EOSINOPHILS 0.1 0.0 - 0.4 K/UL    ABS.  BASOPHILS 0.0 0.0 - 0.1 K/UL    DF AUTOMATED     METABOLIC PANEL, COMPREHENSIVE    Collection Time: 08/02/20 11:10 PM   Result Value Ref Range    Sodium 141 136 - 145 mmol/L    Potassium 4.4 3.5 - 5.5 mmol/L    Chloride 111 100 - 111 mmol/L    CO2 24 21 - 32 mmol/L    Anion gap 6 3.0 - 18 mmol/L    Glucose 91 74 - 99 mg/dL    BUN 7 7.0 - 18 MG/DL    Creatinine 0.81 0.6 - 1.3 MG/DL    BUN/Creatinine ratio 9 (L) 12 - 20      GFR est AA >60 >60 ml/min/1.73m2    GFR est non-AA >60 >60 ml/min/1.73m2    Calcium 8.1 (L) 8.5 - 10.1 MG/DL    Bilirubin, total 0.2 0.2 - 1.0 MG/DL    ALT (SGPT) 11 (L) 13 - 56 U/L    AST (SGOT) 13 10 - 38 U/L    Alk. phosphatase 83 45 - 117 U/L    Protein, total 5.8 (L) 6.4 - 8.2 g/dL    Albumin 3.0 (L) 3.4 - 5.0 g/dL    Globulin 2.8 2.0 - 4.0 g/dL    A-G Ratio 1.1 0.8 - 1.7     LIPASE    Collection Time: 08/02/20 11:10 PM   Result Value Ref Range    Lipase 54 (L) 73 - 393 U/L   CARDIAC PANEL,(CK, CKMB & TROPONIN)    Collection Time: 08/02/20 11:10 PM   Result Value Ref Range    CK - MB <1.0 <3.6 ng/ml    CK-MB Index  0.0 - 4.0 %     CALCULATION NOT PERFORMED WHEN RESULT IS BELOW LINEAR LIMIT    CK 36 26 - 192 U/L    Troponin-I, QT <0.02 0.0 - 0.045 NG/ML       Radiologic Studies -  CT ABD PELV WO CONT   Final Result   IMPRESSION:         1. Mild wall thickening of the cecum and/ascending colon may represent mild   colitis   2. Prior gastric bypass, cholecystectomy and hysterectomy. 3. Anterior abdominal wall subcutaneous induration consistent with recent   procedure/surgery. CT Results  (Last 48 hours)               08/02/20 2203  CT ABD PELV WO CONT Final result    Impression:  IMPRESSION:           1. Mild wall thickening of the cecum and/ascending colon may represent mild   colitis   2. Prior gastric bypass, cholecystectomy and hysterectomy. 3. Anterior abdominal wall subcutaneous induration consistent with recent   procedure/surgery. Narrative:  EXAM: CT of the abdomen and pelvis       INDICATION: Pain. COMPARISON: None. TECHNIQUE: Axial CT imaging of the abdomen and pelvis was performed without   intravenous contrast. Multiplanar reformats were generated.        One or more dose reduction techniques were used on this CT: automated exposure   control, adjustment of the mAs and/or kVp according to patient size, and   iterative reconstruction techniques. The specific techniques used on this CT   exam have been documented in the patient's electronic medical record. Digital   Imaging and Communications in Medicine (DICOM) format image data are available   to nonaffiliated external healthcare facilities or entities on a secure, media   free, reciprocally searchable basis with patient authorization for at least a   12-month period after this study. _______________       FINDINGS:       LOWER CHEST: Bilateral breast implants. LIVER, BILIARY: Liver is normal. No biliary dilation. Gallbladder is surgically   absent. PANCREAS: Normal.       SPLEEN: Normal.       ADRENALS: Normal.       KIDNEYS: Bilateral punctate nonobstructing nephrolithiasis. LYMPH NODES: No enlarged lymph nodes. GASTROINTESTINAL TRACT: Mild wall thickening of the cecum with minimal adjacent   stranding. Gastric postsurgical changes and small bowel anastomosis. PELVIC ORGANS: The uterus is surgically absent. VASCULATURE: Unremarkable. BONES: No acute or aggressive osseous abnormalities identified. OTHER: Lower anterior abdominal wall and periumbilical subcutaneous induration. No focal fluid collections. Millimeter hernia with adipose tissue.       _______________               CXR Results  (Last 48 hours)    None          Medications given in the ED-  Medications   clindamycin (CLEOCIN) capsule 300 mg (has no administration in time range)   oxyCODONE IR (ROXICODONE) tablet 5 mg (has no administration in time range)   ondansetron (ZOFRAN ODT) tablet 4 mg (4 mg Oral Given 8/2/20 2211)   morphine 10 mg/ml injection 6 mg (6 mg IntraMUSCular Given 8/2/20 2211)         Medical Decision Making   I am the first provider for this patient. I reviewed the vital signs, available nursing notes, past medical history, past surgical history, family history and social history.     Vital Signs-Reviewed the patient's vital signs. Pulse Oximetry Analysis - 99% on RA     EKG interpretation: (Preliminary)  20:02  NSR; 87 BPM; No arrhythmia; Normal ST Segments  EKG read by Fay. Wilfrido Mcfarlane MD at 20:02     Records Reviewed: NURSING NOTES AND PREVIOUS MEDICAL RECORDS    Provider Notes (Medical Decision Making): DDx: Primary concern of post-operative complication including wound infection or retained ductal stone. We were not able to see the surgery notes for an intraoperative choleangiogram to confirm no intraductal retained stones. We did find an old CT which suggested no complications, only a 8-9 mm CBD. Will check blood work and scan. We are negotiating her IV access and discussed out-patient management. Procedures:  Procedures    ED Course:   9:14 PM: Initial assessment performed. The patients presenting problems have been discussed, and they are in agreement with the care plan formulated and outlined with them. I have encouraged them to ask questions as they arise throughout their visit. Consult Note:  9:49 PM Discussed patient's history, exam, and available diagnostics results with the pt's PCP, Dr. Kalyani Ortega MD.    Diagnosis and Disposition       DISCHARGE NOTE:  12:24 AM  Kenisha Reardon  results have been reviewed with her. She has been counseled regarding her diagnosis, treatment, and plan. She verbally conveys understanding and agreement of the signs, symptoms, diagnosis, treatment and prognosis and additionally agrees to follow up as discussed. She also agrees with the care-plan and conveys that all of her questions have been answered. I have also provided discharge instructions for her that include: educational information regarding their diagnosis and treatment, and list of reasons why they would want to return to the ED prior to their follow-up appointment, should her condition change. She has been provided with education for proper emergency department utilization.      CLINICAL IMPRESSION:    1. Post-op pain    2. Incisional pain    3. S/P laparoscopic cholecystectomy        PLAN:  1. D/C Home  2. Current Discharge Medication List      START taking these medications    Details   clindamycin (CLEOCIN) 300 mg capsule Take 1 Cap by mouth four (4) times daily for 10 days. Qty: 40 Cap, Refills: 0      carisoprodoL (Soma) 350 mg tablet Take 1 Tab by mouth every eight (8) hours as needed for Muscle Spasm(s). Max Daily Amount: 1,050 mg.  Qty: 12 Tab, Refills: 0    Associated Diagnoses: Post-op pain; Incisional pain           3. Follow-up Information     Follow up With Specialties Details Why Contact Info    Leilani Pritchett MD [facility] Medicine Schedule an appointment as soon as possible for a visit in 1 week For primary care follow up [phone]      [facility] EMERGENCY DEPT Emergency Medicine Go to As needed, if symptoms worsen [address] [place] [id number]  [phone]        _______________________________    This note was partially transcribed via voice recognition software. Although efforts have been made to catch any discrepancies, it may contain sound alike words, grammatical errors, or nonsensical words. _______________________________    Attestation: This note is prepared in-part by Nicolas Parada, acting as Scribe for Yves Grant. Carmen Briggs MD.     Yves Grant. Carmen Briggs MD: The scribe's documentation has been prepared under my direction and personally reviewed by me in its entirety.  I confirm that the note above accurately reflects all work, treatment, procedures, and medical decision making performed by me.       _______________________________

## 2020-08-03 NOTE — ED NOTES
Per provider, labs to be cancelled. Pt to be medicated with IM meds, dry CT to be done w/o IV access.

## 2020-08-03 NOTE — DISCHARGE INSTRUCTIONS
Acute Pain After Surgery: Care Instructions  Your Care Instructions     It's common to have some pain after surgery. Pain doesn't mean that something is wrong or that the surgery didn't go well. But when the pain is severe, it's important to work with your doctor to manage it. It's also important to be aware of a few facts about pain and pain medicine. · You are the only person who knows what your pain feels like. So be sure to tell your doctor when you are in pain or when the pain changes. Then he or she will know how to adjust your medicines. · Pain is often easier to control right after it starts. So it may be better to take regular doses of pain medicine and not wait until the pain gets bad. · Medicine can help control pain. But this doesn't mean you'll have no pain. Medicine works to keep the pain at a level you can live with. With time, you will feel better. Follow-up care is a key part of your treatment and safety. Be sure to make and go to all appointments, and call your doctor if you are having problems. It's also a good idea to know your test results and keep a list of the medicines you take. How can you care for yourself at home? · Be safe with medicines. Read and follow all instructions on the label. ? If the doctor gave you a prescription medicine for pain, take it as prescribed. ? If you are not taking a prescription pain medicine, ask your doctor if you can take an over-the-counter medicine. · If you take an over-the-counter pain medicine, such as acetaminophen (Tylenol), ibuprofen (Advil, Motrin), or naproxen (Aleve), read and follow all instructions on the label. · Do not take two or more pain medicines at the same time unless the doctor told you to. · Do not drink alcohol while you are taking pain medicines. · Try to walk each day if your doctor recommends it. Start by walking a little more than you did the day before. Bit by bit, increase the amount you walk.  Walking increases blood flow. It also helps prevent pneumonia and constipation. · To prevent constipation from opioid pain medicines:  ? Talk to your doctor about a laxative. ? Include fruits, vegetables, beans, and whole grains in your diet each day. These foods are high in fiber. ? Drink plenty of fluids, enough so that your urine is light yellow or clear like water. Drink water, fruit juice, or other drinks that do not contain caffeine or alcohol. If you have kidney, heart, or liver disease and have to limit fluids, talk with your doctor before you increase the amount of fluids you drink. ? Take a fiber supplement, such as Citrucel or Metamucil, every day if needed. Read and follow all instructions on the label. If you take pain medicine for more than a few days, talk to your doctor before you take fiber. When should you call for help? Call your doctor now or seek immediate medical care if:  · Your pain gets worse. · Your pain is not controlled by medicine. Watch closely for changes in your health, and be sure to contact your doctor if you have any problems. Where can you learn more? Go to http://olivia-tutu.info/  Enter H549 in the search box to learn more about \"Acute Pain After Surgery: Care Instructions. \"  Current as of: June 26, 2019               Content Version: 12.5  © 5285-3921 [employer]. Care instructions adapted under license by [employer] (which disclaims liability or warranty for this information). If you have questions about a medical condition or this instruction, always ask your healthcare professional. [place] [address] any warranty or liability for your use of this information.

## 2020-08-03 NOTE — ED NOTES
Pt D/c'd home ambulatory with a steady gait to f/u per orders. Home with Rx x1, and aware of Rx x1 to be picked up. Extensive teaching done about splinting abd when coughing and deep breathing. I have reviewed discharge instructions with the patient. The patient verbalized understanding. Discharge medications reviewed with patient and appropriate educational materials and side effects teaching were provided.   Follow-up Information     Follow up With Specialties Details Why Contact Info    Inga Arambula MD [facility] Medicine Schedule an appointment as soon as possible for a visit in 1 week For primary care follow up [phone]      [facility] EMERGENCY DEPT Emergency Medicine Go to As needed, if symptoms worsen [address] [id number] [phone]